# Patient Record
Sex: MALE | Race: WHITE | URBAN - METROPOLITAN AREA
[De-identification: names, ages, dates, MRNs, and addresses within clinical notes are randomized per-mention and may not be internally consistent; named-entity substitution may affect disease eponyms.]

---

## 2017-08-10 ENCOUNTER — HOSPITAL ENCOUNTER (INPATIENT)
Facility: CLINIC | Age: 32
LOS: 5 days | Discharge: HOME | DRG: 314 | End: 2017-08-15
Attending: INTERNAL MEDICINE | Admitting: INTERNAL MEDICINE
Payer: COMMERCIAL

## 2017-08-10 ENCOUNTER — APPOINTMENT (OUTPATIENT)
Dept: CARDIOLOGY | Facility: CLINIC | Age: 32
DRG: 314 | End: 2017-08-10
Attending: STUDENT IN AN ORGANIZED HEALTH CARE EDUCATION/TRAINING PROGRAM
Payer: COMMERCIAL

## 2017-08-10 DIAGNOSIS — I50.21 ACUTE SYSTOLIC HEART FAILURE (H): Primary | ICD-10-CM

## 2017-08-10 DIAGNOSIS — K59.01 SLOW TRANSIT CONSTIPATION: ICD-10-CM

## 2017-08-10 DIAGNOSIS — K51.011 ULCERATIVE PANCOLITIS WITH RECTAL BLEEDING (H): ICD-10-CM

## 2017-08-10 PROBLEM — I50.9 HEART FAILURE (H): Status: ACTIVE | Noted: 2017-08-10

## 2017-08-10 LAB
ABO + RH BLD: NORMAL
ABO + RH BLD: NORMAL
ALBUMIN SERPL-MCNC: 2.4 G/DL (ref 3.4–5)
ALP SERPL-CCNC: 59 U/L (ref 40–150)
ALT SERPL W P-5'-P-CCNC: 24 U/L (ref 0–70)
ANION GAP SERPL CALCULATED.3IONS-SCNC: 6 MMOL/L (ref 3–14)
APTT PPP: 29 SEC (ref 22–37)
AST SERPL W P-5'-P-CCNC: 10 U/L (ref 0–45)
BILIRUB DIRECT SERPL-MCNC: 0.1 MG/DL (ref 0–0.2)
BILIRUB SERPL-MCNC: 0.5 MG/DL (ref 0.2–1.3)
BUN SERPL-MCNC: 10 MG/DL (ref 7–30)
CALCIUM SERPL-MCNC: 8.6 MG/DL (ref 8.5–10.1)
CHLORIDE SERPL-SCNC: 101 MMOL/L (ref 94–109)
CHOLEST SERPL-MCNC: 137 MG/DL
CK SERPL-CCNC: 74 U/L (ref 30–300)
CO2 SERPL-SCNC: 28 MMOL/L (ref 20–32)
CREAT SERPL-MCNC: 1 MG/DL (ref 0.66–1.25)
CRP SERPL-MCNC: 150 MG/L (ref 0–8)
ERYTHROCYTE [DISTWIDTH] IN BLOOD BY AUTOMATED COUNT: 12.4 % (ref 10–15)
ERYTHROCYTE [SEDIMENTATION RATE] IN BLOOD BY WESTERGREN METHOD: 42 MM/H (ref 0–15)
GFR SERPL CREATININE-BSD FRML MDRD: 87 ML/MIN/1.7M2
GLUCOSE SERPL-MCNC: 141 MG/DL (ref 70–99)
HCT VFR BLD AUTO: 38.1 % (ref 40–53)
HDLC SERPL-MCNC: 25 MG/DL
HGB BLD-MCNC: 12.8 G/DL (ref 13.3–17.7)
INR PPP: 1.17 (ref 0.86–1.14)
LACTATE BLD-SCNC: 1.7 MMOL/L (ref 0.7–2.1)
LDLC SERPL CALC-MCNC: 83 MG/DL
MCH RBC QN AUTO: 31.7 PG (ref 26.5–33)
MCHC RBC AUTO-ENTMCNC: 33.6 G/DL (ref 31.5–36.5)
MCV RBC AUTO: 94 FL (ref 78–100)
NONHDLC SERPL-MCNC: 111 MG/DL
PLATELET # BLD AUTO: 264 10E9/L (ref 150–450)
POTASSIUM SERPL-SCNC: 4.4 MMOL/L (ref 3.4–5.3)
PROT SERPL-MCNC: 6.8 G/DL (ref 6.8–8.8)
RBC # BLD AUTO: 4.04 10E12/L (ref 4.4–5.9)
SODIUM SERPL-SCNC: 134 MMOL/L (ref 133–144)
SPECIMEN EXP DATE BLD: NORMAL
TRIGL SERPL-MCNC: 142 MG/DL
TROPONIN I SERPL-MCNC: 1.37 UG/L (ref 0–0.04)
TROPONIN I SERPL-MCNC: 1.64 UG/L (ref 0–0.04)
TSH SERPL DL<=0.005 MIU/L-ACNC: 1.41 MU/L (ref 0.4–4)
WBC # BLD AUTO: 8.4 10E9/L (ref 4–11)

## 2017-08-10 PROCEDURE — 25000132 ZZH RX MED GY IP 250 OP 250 PS 637: Performed by: STUDENT IN AN ORGANIZED HEALTH CARE EDUCATION/TRAINING PROGRAM

## 2017-08-10 PROCEDURE — 36415 COLL VENOUS BLD VENIPUNCTURE: CPT | Performed by: STUDENT IN AN ORGANIZED HEALTH CARE EDUCATION/TRAINING PROGRAM

## 2017-08-10 PROCEDURE — 85027 COMPLETE CBC AUTOMATED: CPT | Performed by: STUDENT IN AN ORGANIZED HEALTH CARE EDUCATION/TRAINING PROGRAM

## 2017-08-10 PROCEDURE — 99221 1ST HOSP IP/OBS SF/LOW 40: CPT | Mod: 25 | Performed by: INTERNAL MEDICINE

## 2017-08-10 PROCEDURE — 85610 PROTHROMBIN TIME: CPT | Performed by: STUDENT IN AN ORGANIZED HEALTH CARE EDUCATION/TRAINING PROGRAM

## 2017-08-10 PROCEDURE — 93306 TTE W/DOPPLER COMPLETE: CPT | Mod: 26 | Performed by: INTERNAL MEDICINE

## 2017-08-10 PROCEDURE — 83605 ASSAY OF LACTIC ACID: CPT | Performed by: STUDENT IN AN ORGANIZED HEALTH CARE EDUCATION/TRAINING PROGRAM

## 2017-08-10 PROCEDURE — 86140 C-REACTIVE PROTEIN: CPT | Performed by: STUDENT IN AN ORGANIZED HEALTH CARE EDUCATION/TRAINING PROGRAM

## 2017-08-10 PROCEDURE — 93005 ELECTROCARDIOGRAM TRACING: CPT

## 2017-08-10 PROCEDURE — 84484 ASSAY OF TROPONIN QUANT: CPT | Performed by: STUDENT IN AN ORGANIZED HEALTH CARE EDUCATION/TRAINING PROGRAM

## 2017-08-10 PROCEDURE — 80048 BASIC METABOLIC PNL TOTAL CA: CPT | Performed by: STUDENT IN AN ORGANIZED HEALTH CARE EDUCATION/TRAINING PROGRAM

## 2017-08-10 PROCEDURE — 84443 ASSAY THYROID STIM HORMONE: CPT | Performed by: STUDENT IN AN ORGANIZED HEALTH CARE EDUCATION/TRAINING PROGRAM

## 2017-08-10 PROCEDURE — 86900 BLOOD TYPING SEROLOGIC ABO: CPT | Performed by: INTERNAL MEDICINE

## 2017-08-10 PROCEDURE — 85652 RBC SED RATE AUTOMATED: CPT | Performed by: STUDENT IN AN ORGANIZED HEALTH CARE EDUCATION/TRAINING PROGRAM

## 2017-08-10 PROCEDURE — 85730 THROMBOPLASTIN TIME PARTIAL: CPT | Performed by: STUDENT IN AN ORGANIZED HEALTH CARE EDUCATION/TRAINING PROGRAM

## 2017-08-10 PROCEDURE — 80061 LIPID PANEL: CPT | Performed by: STUDENT IN AN ORGANIZED HEALTH CARE EDUCATION/TRAINING PROGRAM

## 2017-08-10 PROCEDURE — 80076 HEPATIC FUNCTION PANEL: CPT | Performed by: STUDENT IN AN ORGANIZED HEALTH CARE EDUCATION/TRAINING PROGRAM

## 2017-08-10 PROCEDURE — 86901 BLOOD TYPING SEROLOGIC RH(D): CPT | Performed by: INTERNAL MEDICINE

## 2017-08-10 PROCEDURE — 20000004 ZZH R&B ICU UMMC

## 2017-08-10 PROCEDURE — 93306 TTE W/DOPPLER COMPLETE: CPT

## 2017-08-10 PROCEDURE — 82550 ASSAY OF CK (CPK): CPT | Performed by: STUDENT IN AN ORGANIZED HEALTH CARE EDUCATION/TRAINING PROGRAM

## 2017-08-10 RX ORDER — DOCUSATE SODIUM 100 MG/1
100 CAPSULE, LIQUID FILLED ORAL 2 TIMES DAILY
Status: DISCONTINUED | OUTPATIENT
Start: 2017-08-10 | End: 2017-08-12

## 2017-08-10 RX ORDER — ONDANSETRON 2 MG/ML
4 INJECTION INTRAMUSCULAR; INTRAVENOUS EVERY 6 HOURS PRN
Status: DISCONTINUED | OUTPATIENT
Start: 2017-08-10 | End: 2017-08-15 | Stop reason: HOSPADM

## 2017-08-10 RX ORDER — ALUMINA, MAGNESIA, AND SIMETHICONE 2400; 2400; 240 MG/30ML; MG/30ML; MG/30ML
15-30 SUSPENSION ORAL EVERY 4 HOURS PRN
Status: DISCONTINUED | OUTPATIENT
Start: 2017-08-10 | End: 2017-08-15 | Stop reason: HOSPADM

## 2017-08-10 RX ORDER — ONDANSETRON 4 MG/1
4 TABLET, ORALLY DISINTEGRATING ORAL EVERY 6 HOURS PRN
Status: DISCONTINUED | OUTPATIENT
Start: 2017-08-10 | End: 2017-08-15 | Stop reason: HOSPADM

## 2017-08-10 RX ORDER — LIDOCAINE 40 MG/G
CREAM TOPICAL
Status: DISCONTINUED | OUTPATIENT
Start: 2017-08-10 | End: 2017-08-15 | Stop reason: HOSPADM

## 2017-08-10 RX ORDER — BISACODYL 5 MG
5-15 TABLET, DELAYED RELEASE (ENTERIC COATED) ORAL DAILY PRN
Status: DISCONTINUED | OUTPATIENT
Start: 2017-08-10 | End: 2017-08-15 | Stop reason: HOSPADM

## 2017-08-10 RX ORDER — PREDNISONE 20 MG/1
60 TABLET ORAL DAILY
Status: DISCONTINUED | OUTPATIENT
Start: 2017-08-11 | End: 2017-08-15 | Stop reason: HOSPADM

## 2017-08-10 RX ORDER — ACETAMINOPHEN 650 MG/1
650 SUPPOSITORY RECTAL EVERY 4 HOURS PRN
Status: DISCONTINUED | OUTPATIENT
Start: 2017-08-10 | End: 2017-08-15 | Stop reason: HOSPADM

## 2017-08-10 RX ORDER — ACETAMINOPHEN 325 MG/1
650 TABLET ORAL EVERY 4 HOURS PRN
Status: DISCONTINUED | OUTPATIENT
Start: 2017-08-10 | End: 2017-08-15 | Stop reason: HOSPADM

## 2017-08-10 RX ORDER — MESALAMINE 4 G/60ML
4 SUSPENSION RECTAL AT BEDTIME
Status: DISCONTINUED | OUTPATIENT
Start: 2017-08-10 | End: 2017-08-11

## 2017-08-10 RX ADMIN — MESALAMINE 4 G: 4 ENEMA RECTAL at 21:47

## 2017-08-10 ASSESSMENT — PAIN DESCRIPTION - DESCRIPTORS: DESCRIPTORS: CRAMPING

## 2017-08-10 NOTE — PLAN OF CARE
Problem: Goal Outcome Summary  Goal: Goal Outcome Summary  Outcome: No Change  D: Transferred from OSH. VSS, room air, saline locked.  I/A: Admitted and labs drawn. Plan for MRI tomorrow. Checklist sent to MRI.  R: Will continue to monitor/assess and update MD as needed.

## 2017-08-10 NOTE — IP AVS SNAPSHOT
MRN:[id number]                      After Visit Summary   8/10/2017    Eugene Potter    MRN: [id number]           Thank you!     Thank you for choosing [facility] for your care. Our goal is always to provide you with excellent care. Hearing back from our patients is one way we can continue to improve our services. Please take a few minutes to complete the written survey that you may receive in the mail after you visit with us. Thank you!        Patient Information     Date Of Birth          1985        Designated Caregiver       Most Recent Value    Caregiver    Will someone help with your care after discharge? yes    Name of designated caregiver Robyn    Phone number of caregiver [phone]    Caregiver address [place]      About your hospital stay     You were admitted on:  August 10, 2017 You last received care in the:  [address] [facility]    You were discharged on:  August 15, 2017        Reason for your hospital stay       You were hospitalized for an ulcerative colitis flare and for a new development of heart failure.  You will need close follow-up with a cardiologist and gastroenterologist in [place].                  Who to Call     For medical emergencies, please call 911.  For non-urgent questions about your medical care, please call your primary care provider or clinic, [phone]  For questions related to your surgery, please call your surgery clinic        Attending Provider     Provider Specialty    Ambar Choudhury MD Cardiology    MickletonRamón MD Internal Medicine       Primary Care Provider Office Phone # Fax #    Morelia Healy [phone]       When to contact your care team       Call your primary doctor if you have any of the following: temperature greater than 100.4F or less than 96F, worsening bloody stools, worsening pain, worsening shortness of breath or chest pain.                  After Care Instructions     Activity        Your activity upon discharge: activity as tolerated            Diet       Follow this diet upon discharge: Orders Placed This Encounter      Fluid restriction 2000 ML FLUID      No Lactose Diet            Discharge Instructions       Please continue to take your prednisone 60mg by mouth daily until you see your gastroenterologist in [place] and they tell you to change or stop the dose. Please also follow-up with cardiology in [place].  You will need a repeat echocardiogram to measure the squeeze of your heart in about 4 weeks.            Monitor and record       weight every day.  Please call your primary care doctor if you gain more than 3 pounds in a day or 5 pounds over two days.                  Follow-up Appointments     Follow Up and recommended labs and tests       Follow up with primary care provider, Morelia Healy, within 7 days to evaluate medication change and for hospital follow- up.  The following labs/tests are recommended: CBC, BMP, CRP.    Follow up with Cardiology and gastroenterology at [facility] in [place] , within 2-3 weeks  to evaluate medication change, to evaluate treatment change and for hospital follow- up.  You should also follow up with Dr. Beltran in [place] who is a heart failure specialist in the next 4 weeks.  The following labs/tests are recommended: CBC, BMP, CRP.  You will need a repeat echocardiogram to evaluate your heart function in 4 weeks unless your cardiologist decides a different time frame.                  Additional Services     Cardiology Eval Adult Referral       Your provider has referred you to:  [facility]    Please be aware that coverage of these services is subject to the terms and limitations of your health insurance plan.  Call member services at your health plan with any benefit or coverage questions.      Type of Referral:  Cardiology Follow Up and Heart failure     Timeframe requested:  2-3 weeks.     Please bring the  following to your appointment:  >>   Any x-rays, CTs or MRIs which have been performed.  Contact the facility where they were done to arrange for  prior to your scheduled appointment.    >>   List of current medications  >>   This referral request   >>   Any documents/labs given to you for this referral            GASTROENTEROLOGY ADULT REF CONSULT ONLY       Preferred Location: [facility] within 2-3 weeks.       Please be aware that coverage of these services is subject to the terms and limitations of your health insurance plan.  Call member services at your health plan with any benefit or coverage questions.  Any procedures must be performed at a [facility] facility OR coordinated by your clinic's referral office.    Please bring the following with you to your appointment:    (1) Any X-Rays, CTs or MRIs which have been performed.  Contact the facility where they were done to arrange for  prior to your scheduled appointment.    (2) List of current medications   (3) This referral request   (4) Any documents/labs given to you for this referral                  Further instructions from your care team       You will be receiving a call from the Gastro clinic at [facility] to confirm the time and date of your follow up appointment. This appointment should be within 2-3 weeks of discharge. If you do not hear from them within 5 business days, please call them at [phone].      You will be receiving a call from the Cardiology clinic at [facility] to confirm the time and date of your follow up appointment. This appointment should be within 2-4 weeks of discharge. If you do not hear from them within 5 business days, please call them at [phone].      General Recommendations To Control Heart Failure When You Get Home     Instructions To Patients and Families: Please read and check off each of these important instructions as you do them when you get home.           Weight and symptoms      ___  "Put a scale in your bathroom  ___ Post a weight chart or calendar next to the scale  ___Weigh yourself every day as soon as you you get up in the morning. You should only be wearing your pajamas. Write your weight on the chart/calendar.  ___ Bring your weight chart/calendar with you to all appointments    ___Call your doctor if you gain 2 pounds in 1 day or 5 pounds in 1 week from your \"dry\" weight (baseline weight). Also call your doctor if you have shortness of breath that gets worse over time, leg swelling or fatigue.         Medicines and diet     ___ Make sure to take your medicines as prescribed.    ___Bring a current list of your medicines and all of your medicine bottles with you to all appointments.    ___ Limit fluids if you still have swelling or shortness of breath, or if your doctor tells you to do so.  ___ Eat less than 2000 mg of sodium (salt) every day. Read food labels, and do not add salt to meals.   ___ Heart healthy diet with low fat and low cholesterol          Activity and suggested lifestyle changes    ___ Stay active. Talk to your doctor about an exercise program that is safe for your heart.    ___ Stop smoking. Reduce alcohol use.      ___ Lose weight if you are overweight. Extra weight puts a lot of stress on the heart.          Control for Leg Swelling   ___ Keep your legs elevated (raised) as needed for swelling. If swelling is uncomfortable or elevation doesn t help, ask your doctor about using ACE wrap or Jobst stockings.          Follow-up appointments   ___ Make a [facility] appointment with a basic metabolic panel lab draw 3 to 5 days after you leave the hospital. Call one of the following locations:   [facility]  [phone],  [facility] [phone],  [facility]  [phone].     ___ Make sure to take your medications as prescribed and bring an accurate list of your medications and " "your weight chart/calendar to your follow up appointment at the [facility] Clinic for continued education and adjustments          What is the C[facility] clinic?    The [facility].O.R.[facility] (Cardiomyopathy, Optimization, Rehabilitation, Education) Clinic is a heart failure specialty clinic within the [facility]. At [facility] you will work with nurse practitioners to carefully adjust medicines, get education and learn who and when to call if symptoms appear. [facility] nurses specialize in helping you:    better understand your disease.    slow the progress of your disease.    improve the length and quality of your life.    detect future heart problems before they become life threatening.    avoid hospital stays.            Pending Results     Date and Time Order Name Status Description    8/14/2017 1153 Surgical pathology exam In process     8/12/2017 0919 Cytomegalovirus Qualitative PCR In process             Statement of Approval     Ordered          08/15/17 1128  I have reviewed and agree with all the recommendations and orders detailed in this document.  EFFECTIVE NOW     Approved and electronically signed by:  Easton Bo MD             Admission Information     Date & Time Provider Department Dept. Phone    8/10/2017 Ramón Juares MD [address] [facility] [phone]      Your Vitals Were     Blood Pressure Pulse Temperature Respirations Height Weight    113/63 (BP Location: Left arm) 87 98.8  F (37.1  C) (Oral) 18 1.88 m (6' 2\") 74.9 kg (165 lb 3.2 oz)    Pulse Oximetry BMI (Body Mass Index)                98% 21.21 kg/m2          MyChart Information     [employer] lets you send messages to your doctor, view your test results, renew your prescriptions, schedule appointments and more. To sign up, go to [email or web address] . Click on \"Log in\" on the left side of the screen, which will take you to the Welcome page. Then click on \"Sign up Now\" on the right side of the " page.     You will be asked to enter the access code listed below, as well as some personal information. Please follow the directions to create your username and password.     Your access code is: [email or web address]  Expires: 2017 11:16 AM     Your access code will  in 90 days. If you need help or a new code, please call your [place] clinic or [phone].        Care EveryWhere ID     This is your Care EveryWhere ID. This could be used by other organizations to access your [place] medical records  [id number]        Equal Access to Services     [facility]: Hadii tae Castellanos, wajoshua salvador, qadanielle guerrero, cookie shane . So [facility] [phone].    ATENCIÓN: Si habla español, tiene a hughes disposición servicios gratuitos de asistencia lingüística. Llame al [phone].    We comply with applicable federal civil rights laws and [place] laws. We do not discriminate on the basis of race, color, national origin, age, disability sex, sexual orientation or gender identity.               Review of your medicines      START taking        Dose / Directions    bisacodyl 5 MG EC tablet   Used for:  Slow transit constipation        Dose:  5-15 mg   Take 1-3 tablets (5-15 mg) by mouth daily as needed for constipation ( )   Quantity:  60 tablet   Refills:  3       lisinopril 2.5 MG tablet   Commonly known as:  PRINIVIL/Zestril        Dose:  2.5 mg   Take 1 tablet (2.5 mg) by mouth daily   Quantity:  30 tablet   Refills:  3       metoprolol 25 MG 24 hr tablet   Commonly known as:  TOPROL-XL        Dose:  12.5 mg   Take 0.5 tablets (12.5 mg) by mouth daily   Quantity:  30 tablet   Refills:  3       predniSONE 20 MG tablet   Commonly known as:  DELTASONE   Used for:  Ulcerative pancolitis with rectal bleeding (H)        Dose:  60 mg   Take 3 tablets (60 mg) by mouth daily   Quantity:  90 tablet   Refills:  5            Where to get your medicines      These medications  were sent to [facility] [place], [place] - [address]  [address], [facility] [address]     Phone:  [phone]     bisacodyl 5 MG EC tablet    lisinopril 2.5 MG tablet    metoprolol 25 MG 24 hr tablet    predniSONE 20 MG tablet                Protect others around you: Learn how to safely use, store and throw away your medicines at www.disposemymeds.org.             Medication List: This is a list of all your medications and when to take them. Check marks below indicate your daily home schedule. Keep this list as a reference.      Medications           Morning Afternoon Evening Bedtime As Needed    bisacodyl 5 MG EC tablet   Take 1-3 tablets (5-15 mg) by mouth daily as needed for constipation ( )                                   lisinopril 2.5 MG tablet   Commonly known as:  PRINIVIL/Zestril   Take 1 tablet (2.5 mg) by mouth daily   Last time this was given:  2.5 mg on 8/15/2017  8:40 AM                                   metoprolol 25 MG 24 hr tablet   Commonly known as:  TOPROL-XL   Take 0.5 tablets (12.5 mg) by mouth daily   Last time this was given:  12.5 mg on 8/15/2017  8:40 AM                                   predniSONE 20 MG tablet   Commonly known as:  DELTASONE   Take 3 tablets (60 mg) by mouth daily   Last time this was given:  60 mg on 8/15/2017  8:40 AM

## 2017-08-10 NOTE — IP AVS SNAPSHOT
[address] [address]    Phone:  [phone]                                       After Visit Summary   8/10/2017    Eugene Potter    MRN: [id number]           After Visit Summary Signature Page     I have received my discharge instructions, and my questions have been answered. I have discussed any challenges I see with this plan with the nurse or doctor.    ..........................................................................................................................................  Patient/Patient Representative Signature      ..........................................................................................................................................  Patient Representative Print Name and Relationship to Patient    ..................................................               ................................................  Date                                            Time    ..........................................................................................................................................  Reviewed by Signature/Title    ...................................................              ..............................................  Date                                                            Time

## 2017-08-10 NOTE — LETTER
Transition Communication Hand-off for Care Transitions to Next Level of Care Provider    Name: Eugene Potter  MRN #: [id number]  Primary Care Provider: Morelia Healy     Primary Clinic: [facility] [address] [facility] [address]     Reason for Hospitalization:  Heart failure (H) [I50.9]  Admit Date/Time: 8/10/2017 12:33 PM  Discharge Date: 8/15/2017    Payor Source: Pt recently applied for MA, application status pending         Reason for Communication Hand-off Referral: Other Continuity of care    Discharge Plan: discharge to home with follow-up    Concern for non-adherence with plan of care:   No  Discharge Needs Assessment:  Needs       Most Recent Value    Anticipated Changes Related to Illness none          Follow-up specialty is recommended: Yes    Follow-up plan:  No future appointments.    Any outstanding tests or procedures:        Referrals     Future Labs/Procedures    Cardiology Eval Adult Referral     Comments:    Your provider has referred you to:  [facility]    Please be aware that coverage of these services is subject to the terms and limitations of your health insurance plan.  Call member services at your health plan with any benefit or coverage questions.      Type of Referral:  Cardiology Follow Up and Heart failure     Timeframe requested:  2-3 weeks.     Please bring the following to your appointment:  >>   Any x-rays, CTs or MRIs which have been performed.  Contact the facility where they were done to arrange for  prior to your scheduled appointment.    >>   List of current medications  >>   This referral request   >>   Any documents/labs given to you for this referral    GASTROENTEROLOGY ADULT REF CONSULT ONLY     Comments:    Preferred Location: [facility] [place] within 2-3 weeks.       Please be aware that coverage of these services is subject to the terms and limitations of your health insurance plan.  Call member services at your health plan with  any benefit or coverage questions.  Any procedures must be performed at a [place] facility OR coordinated by your clinic's referral office.    Please bring the following with you to your appointment:    (1) Any X-Rays, CTs or MRIs which have been performed.  Contact the facility where they were done to arrange for  prior to your scheduled appointment.    (2) List of current medications   (3) This referral request   (4) Any documents/labs given to you for this referral            Daya Mcdermott  RN Care Coordinator  [phone]    AVS/Discharge Summary is the source of truth; this is a helpful guide for improved communication of patient story

## 2017-08-10 NOTE — H&P
Cardiology admission note  Attending: Dr. Choudhury  Date of Service: 8/10/2017      HPI:   This is a 33 YO M with history of Ulcerative colitis diagnosed approximately 1 month ago by colonoscopy and bx, on oral prednisone and mesalamine, presented to [facility] with chest pain. Patient was on a trip with family recently and many of the family members were sick with congestion. He started experiencing sharp, constant, stabbing chest pain worse with deep breathing and positional, about 3 days ago when he was admitted to the hospital. His trops were normal at admission and were elevated with peak trops in the 1.97 range and came down. A CT chest was done that did not show any acute findings, no effusion, no PE. Patient was found to be tachycardic this AM in the 140s (all sinus). His blood pressure was reported to be 77/41 once before transfer and he was airlifted from [facility] to here for further evaluation.    Patient says his chest pain is much better here today. Was able to get up and take bath and feels fine. Denies any SOB now. Is tachycardic.     Past Medical History:   - Ulcerative colitis  Allergies: Per MAR   No Known Allergies  Medications:   Per MAR current outpatient cardiovascular medications include:   Rowasa 4 g by rectum qhs  Prednisone 60 mg by mouth everyday    Current Facility-Administered Medications   Medication Dose Route Frequency     sodium chloride (PF)  3 mL Intracatheter Q8H     docusate sodium  100 mg Oral BID     [START ON 8/11/2017] predniSONE  60 mg Oral Daily     mesalamine  4 g Rectal At Bedtime     Family History:   No significant family history of heart disease.  Social History:   Social History   Substance Use Topics     Smoking status: Not on file     Smokeless tobacco: Not on file     Alcohol use Not on file       ROS:   A comprehensive 10 point ROS was negative other than as mentioned in HPI.    Physical Examination:   VITALS: BP 92/64  Temp 97.9  F (36.6  C) (Oral)  Resp 20  Ht  "1.88 m (6' 2\")  Wt 80.6 kg (177 lb 11.1 oz)  SpO2 96%  BMI 22.81 kg/m2    GENERAL APPEARANCE: AxO, NAD   HEENT: NCAT, EOMI, MMM.   NECK: JVP 8 cm  CHEST: CTAB   CARDIOVASCULAR: S1S2, Reg, No m/r/g. tachycardic   ABDOMEN: BS+, soft, No pulsatile masses or bruits.   EXTREMITIES: No pedal edema. Distal pulses intact.   NEURO: Grossly nonfocal.   PSYCH: Normal affect.  SKIN: Warm and dry.   Data:   Labs:  Results for orders placed or performed during the hospital encounter of 08/10/17 (from the past 24 hour(s))   Echocardiogram Complete    Narrative    [id number]  [id number]  [id number]  [id number]^CHAY ZHOU^GIOVANNI^           [facility],[place]  Echocardiography Laboratory  [address] [place] [id number]     Name: MARTA PEDRO  MRN: [id number]  : 1985  Study Date: 08/10/2017 01:08 PM  Age: 32 yrs  Gender: Male  Patient Location: [facility]  Reason For Study: Heart Failure  Ordering Physician: GIOVANNI MCKEON  Referring Physician: ALTON ARMSTRONG  Performed By: OCTAVIANO Webber     BSA: 2.1 m2  Height: 74 in  Weight: 177 lb  BP: 92/64 mmHg  _____________________________________________________________________________  __        Procedure  Echocardiogram with two-dimensional, color and spectral Doppler performed.  _____________________________________________________________________________  __        Interpretation Summary  Left ventricular size is normal.  Biplane LV EF calculated at 36%.  Right ventricular function, chamber size, wall motion, and thickness are  normal.  The inferior vena cava is normal.  Trivial pericardial effusion is present.  _____________________________________________________________________________  __        Left Ventricle  Left ventricular size is normal. Left ventricular wall thickness is normal.  Biplane LV EF calculated at 36%. Left ventricular diastolic function is  indeterminate. Moderate to severe diffuse hypokinesis is " present.     Right Ventricle  Right ventricular function, chamber size, wall motion, and thickness are  normal.     Atria  Both atria appear normal. The atrial septum is intact as assessed by color  Doppler .     Mitral Valve  The mitral valve is normal. Trace mitral insufficiency is present.        Aortic Valve  Aortic valve is normal in structure and function.     Tricuspid Valve  The tricuspid valve is normal. Trace tricuspid insufficiency is present.  Pulmonary artery systolic pressure cannot be assessed.     Pulmonic Valve  The pulmonic valve is normal. Trace pulmonic insufficiency is present.     Vessels  The aorta root is normal. The pulmonary artery is normal. The inferior vena  cava is normal.     Pericardium  Trivial pericardial effusion is present.     _____________________________________________________________________________  __  MMode/2D Measurements & Calculations  RVDd: 3.2 cm  IVSd: 1.0 cm  LVIDd: 4.9 cm  LVIDs: 3.9 cm  LVPWd: 1.1 cm  FS: 21.4 %  EDV(Teich): 113.7 ml  ESV(Teich): 64.4 ml     LV mass(C)d: 189.2 grams  LV mass(C)dI: 91.7 grams/m2  Ao root diam: 3.1 cm  asc Aorta Diam: 2.6 cm  LA/Ao: 1.1  LVOT diam: 2.3 cm  LVOT area: 4.3 cm2     EF(MOD-bp): 36.5 %  LA Volume (BP): 69.9 ml  TAPSE: 1.7 cm                 _____________________________________________________________________________  __           Report approved by: Gia Esqueda 08/10/2017 01:40 PM      Basic metabolic panel   Result Value Ref Range    Sodium 134 133 - 144 mmol/L    Potassium 4.4 3.4 - 5.3 mmol/L    Chloride 101 94 - 109 mmol/L    Carbon Dioxide 28 20 - 32 mmol/L    Anion Gap 6 3 - 14 mmol/L    Glucose 141 (H) 70 - 99 mg/dL    Urea Nitrogen 10 7 - 30 mg/dL    Creatinine 1.00 0.66 - 1.25 mg/dL    GFR Estimate 87 >60 mL/min/1.7m2    GFR Estimate If Black >90   GFR Calc   >60 mL/min/1.7m2    Calcium 8.6 8.5 - 10.1 mg/dL   Troponin I   Result Value Ref Range    Troponin I ES 1.636 (HH) 0.000 - 0.045 ug/L    CBC with platelets   Result Value Ref Range    WBC 8.4 4.0 - 11.0 10e9/L    RBC Count 4.04 (L) 4.4 - 5.9 10e12/L    Hemoglobin 12.8 (L) 13.3 - 17.7 g/dL    Hematocrit 38.1 (L) 40.0 - 53.0 %    MCV 94 78 - 100 fl    MCH 31.7 26.5 - 33.0 pg    MCHC 33.6 31.5 - 36.5 g/dL    RDW 12.4 10.0 - 15.0 %    Platelet Count 264 150 - 450 10e9/L   INR   Result Value Ref Range    INR 1.17 (H) 0.86 - 1.14   Partial thromboplastin time   Result Value Ref Range    PTT 29 22 - 37 sec   Hepatic panel   Result Value Ref Range    Bilirubin Direct 0.1 0.0 - 0.2 mg/dL    Bilirubin Total 0.5 0.2 - 1.3 mg/dL    Albumin 2.4 (L) 3.4 - 5.0 g/dL    Protein Total 6.8 6.8 - 8.8 g/dL    Alkaline Phosphatase 59 40 - 150 U/L    ALT 24 0 - 70 U/L    AST 10 0 - 45 U/L   Lipid panel reflex to direct LDL   Result Value Ref Range    Cholesterol 137 <200 mg/dL    Triglycerides 142 <150 mg/dL    HDL Cholesterol 25 (L) >39 mg/dL    LDL Cholesterol Calculated 83 <100 mg/dL    Non HDL Cholesterol 111 <130 mg/dL   CK total   Result Value Ref Range    CK Total 74 30 - 300 U/L   CRP inflammation   Result Value Ref Range    CRP Inflammation 150.0 (H) 0.0 - 8.0 mg/L   TSH with free T4 reflex   Result Value Ref Range    TSH 1.41 0.40 - 4.00 mU/L   Erythrocyte sedimentation rate auto   Result Value Ref Range    Sed Rate 42 (H) 0 - 15 mm/h   ABO and Rh   Result Value Ref Range    ABO Pending     RH(D) Pending     Specimen Expires Pending       EKG: Sinus tachycardia.    Tele:   Assessment:   This is a 31 YO male transferred from [facility] for complaints of chest pain and low ejection fraction.   - Acute decompensated heart failure with systolic dysfunction with LVEF of 35% with normal RV function, currently NYHA Class III etiology probably viral myocarditis, unlikely giant cell, cannot rule out other etiologies.     Plan:  - There is no acute need for swan tonight or biopsy. His vitals are stable except that he is a little tachycardic. Will continue to monitor in ICU  with tele considering myocardial inflammation.  - Plan for cardiac MRI in the AM. Will decide on biopsy depending on his cardiac MRI in the AM.  - Etiology most likely being viral myocarditis considering his immunosuppression. Unlikely giant cell with stable vitals for the last week. No need for pulse steroid. MRI will help to see if any other inflammatory etiology.    - Will not initiate any cardiac medications now.   - Continue mesalamine and prednisone per his home regimen.  - DVT ppx- SCD and ambulation.   - Diet - diet for UC.  - Dispo- ICU tonight.     The patient was discussed w/ Dr. Choudhury.  The above note reflects our joint plan.    Jaspal Olea MD  Cardiology   Pager: [phone]      Late entry - pt seen and examined on 8/10/17  I have reviewed today's vital signs, notes, medications, labs and imaging. I have also seen and examined the patient and agree with the findings and plan as outlined above.    Ambar Choudhury MD  Section Head - Advanced Heart Failure, Transplantation and Mechanical Circulatory Support  Co-Director - Adult Congenital and Cardiovascular Genetics Center  Associate Professor of Medicine, [facility]

## 2017-08-11 ENCOUNTER — APPOINTMENT (OUTPATIENT)
Dept: MRI IMAGING | Facility: CLINIC | Age: 32
DRG: 314 | End: 2017-08-11
Attending: INTERNAL MEDICINE
Payer: COMMERCIAL

## 2017-08-11 LAB
ANION GAP SERPL CALCULATED.3IONS-SCNC: 7 MMOL/L (ref 3–14)
BUN SERPL-MCNC: 12 MG/DL (ref 7–30)
CALCIUM SERPL-MCNC: 8.5 MG/DL (ref 8.5–10.1)
CHLORIDE SERPL-SCNC: 101 MMOL/L (ref 94–109)
CO2 SERPL-SCNC: 28 MMOL/L (ref 20–32)
CREAT SERPL-MCNC: 0.89 MG/DL (ref 0.66–1.25)
CRP SERPL-MCNC: 140 MG/L (ref 0–8)
GFR SERPL CREATININE-BSD FRML MDRD: NORMAL ML/MIN/1.7M2
GLUCOSE SERPL-MCNC: 96 MG/DL (ref 70–99)
INTERPRETATION ECG - MUSE: NORMAL
POTASSIUM SERPL-SCNC: 3.6 MMOL/L (ref 3.4–5.3)
SODIUM SERPL-SCNC: 136 MMOL/L (ref 133–144)

## 2017-08-11 PROCEDURE — 25000125 ZZHC RX 250: Performed by: STUDENT IN AN ORGANIZED HEALTH CARE EDUCATION/TRAINING PROGRAM

## 2017-08-11 PROCEDURE — 25000128 H RX IP 250 OP 636: Performed by: INTERNAL MEDICINE

## 2017-08-11 PROCEDURE — 99231 SBSQ HOSP IP/OBS SF/LOW 25: CPT | Mod: GC | Performed by: INTERNAL MEDICINE

## 2017-08-11 PROCEDURE — A9585 GADOBUTROL INJECTION: HCPCS | Performed by: INTERNAL MEDICINE

## 2017-08-11 PROCEDURE — 75561 CARDIAC MRI FOR MORPH W/DYE: CPT

## 2017-08-11 PROCEDURE — 86140 C-REACTIVE PROTEIN: CPT | Performed by: INTERNAL MEDICINE

## 2017-08-11 PROCEDURE — 25000132 ZZH RX MED GY IP 250 OP 250 PS 637: Performed by: STUDENT IN AN ORGANIZED HEALTH CARE EDUCATION/TRAINING PROGRAM

## 2017-08-11 PROCEDURE — 21400006 ZZH R&B CCU INTERMEDIATE UMMC

## 2017-08-11 PROCEDURE — 75561 CARDIAC MRI FOR MORPH W/DYE: CPT | Mod: 26 | Performed by: INTERNAL MEDICINE

## 2017-08-11 PROCEDURE — 36415 COLL VENOUS BLD VENIPUNCTURE: CPT | Performed by: INTERNAL MEDICINE

## 2017-08-11 PROCEDURE — 25000132 ZZH RX MED GY IP 250 OP 250 PS 637: Performed by: INTERNAL MEDICINE

## 2017-08-11 PROCEDURE — 25000131 ZZH RX MED GY IP 250 OP 636 PS 637: Performed by: STUDENT IN AN ORGANIZED HEALTH CARE EDUCATION/TRAINING PROGRAM

## 2017-08-11 PROCEDURE — 80048 BASIC METABOLIC PNL TOTAL CA: CPT | Performed by: INTERNAL MEDICINE

## 2017-08-11 RX ORDER — SODIUM CHLORIDE 9 MG/ML
INJECTION, SOLUTION INTRAVENOUS CONTINUOUS
Status: DISPENSED | OUTPATIENT
Start: 2017-08-11 | End: 2017-08-11

## 2017-08-11 RX ORDER — OXYCODONE AND ACETAMINOPHEN 5; 325 MG/1; MG/1
1 TABLET ORAL ONCE
Status: DISCONTINUED | OUTPATIENT
Start: 2017-08-11 | End: 2017-08-11

## 2017-08-11 RX ORDER — GADOBUTROL 604.72 MG/ML
10 INJECTION INTRAVENOUS ONCE
Status: COMPLETED | OUTPATIENT
Start: 2017-08-11 | End: 2017-08-11

## 2017-08-11 RX ORDER — OXYCODONE HYDROCHLORIDE 5 MG/1
5 TABLET ORAL ONCE
Status: COMPLETED | OUTPATIENT
Start: 2017-08-11 | End: 2017-08-11

## 2017-08-11 RX ADMIN — GADOBUTROL 10 ML: 604.72 INJECTION INTRAVENOUS at 08:48

## 2017-08-11 RX ADMIN — PREDNISONE 60 MG: 50 TABLET ORAL at 09:45

## 2017-08-11 RX ADMIN — ACETAMINOPHEN 650 MG: 325 TABLET, FILM COATED ORAL at 05:05

## 2017-08-11 RX ADMIN — OXYCODONE HYDROCHLORIDE 5 MG: 5 TABLET ORAL at 05:04

## 2017-08-11 RX ADMIN — SODIUM CHLORIDE: 9 INJECTION, SOLUTION INTRAVENOUS at 13:00

## 2017-08-11 ASSESSMENT — PAIN DESCRIPTION - DESCRIPTORS
DESCRIPTORS: CRAMPING
DESCRIPTORS: CRAMPING

## 2017-08-11 NOTE — PROGRESS NOTES
[facility]  CARDIOLOGY DAILY PROGRESS NOTE    Interval History:   1 dose of oxy overnight. No further pain. Slept well.     Assessment/Plan  Assessment:   This is a 33 YO male transferred from [facility] for complaints of chest pain and low ejection fraction.   - Acute decompensated heart failure with systolic dysfunction with LVEF of 35% with normal RV function, currently NYHA Class III secondary to myocarditis with trop elevation.   - Ulcerative colitis     Plan:    - MRI showed no myocardial inflammation, no scar. Etiology most likely being mesalamine from his UC treatment, less likely viral as this appears to have a temporal association with initiation of therapy. No scar to suggest other forms of inflammatory myocarditis.   - Hold mesalamine bowel regimen today and calling GI for evaluation of his Ulcerative colitis alternate therapy.  - Will not initiate any cardiac medications now. Blood pressures soft today.   - Probably low on volume, does not appear to be congested. Was NPO overnight and has repeated bowel movements causing fluid loss.   - Continue prednisone per his home regimen.  - DVT ppx- ambulate as tolerated.   - Diet - diet for UC.  - Dispo- transfer to floor.      The patient was discussed w/ Dr. Coulter.  The above note reflects our joint plan.     Jaspal Olea MD  Cardiology   Pager: [phone]      Objective  Temp:  [97.9  F (36.6  C)-98.9  F (37.2  C)] 98  F (36.7  C)  Heart Rate:  [] 96  Resp:  [16-20] 18  BP: ()/(38-83) 112/78  SpO2:  [93 %-100 %] 100 %    Intake/Output Summary (Last 24 hours) at 08/11/17 0704    Wt Readings from Last 5 Encounters:   08/11/17 79.3 kg (174 lb 13.2 oz)     GEN: Well-appearing, mobile, obese  CV: RRR, no m/r/g, JVP 8 cm.   PULM: mild wheezes throughout and crackles at b/l bases  ABD: soft, NT/ND    Current Medications    sodium chloride (PF)  3 mL Intracatheter Q8H     docusate sodium  100 mg Oral BID     predniSONE  60  mg Oral Daily     mesalamine  4 g Rectal At Bedtime     Lab Values  Labs have been reviewed  BMP  Recent Labs  Lab 08/11/17  0605 08/10/17  1358    134   POTASSIUM 3.6 4.4   CHLORIDE 101 101   LON 8.5 8.6   CO2 28 28   BUN 12 10   CR 0.89 1.00   GLC 96 141*     LFTs  Recent Labs  Lab 08/10/17  1358   ALKPHOS 59   AST 10   ALT 24   BILITOTAL 0.5   PROTTOTAL 6.8   ALBUMIN 2.4*      CBC  Recent Labs  Lab 08/10/17  1358   WBC 8.4   RBC 4.04*   HGB 12.8*   HCT 38.1*   MCV 94   MCH 31.7   MCHC 33.6   RDW 12.4        INR  Recent Labs  Lab 08/10/17  1358   INR 1.17*     TpnInvalid input(s): TPN    Discussed w/ Dr Yfn Olea MD  Cardiology Fellow    Attending Attestation:  Patient seen and examined by me with the team. I have performed all pertinent elements of the physical examination and reviewed the note above. I have reviewed pertinent laboratory, echocardiographic, imaging, and cardiac catheterization results. I agree with the plan of care as described in this note.    Javier Coulter MD, PhD

## 2017-08-11 NOTE — PROGRESS NOTES
Transfer  Transferred from: 4E  Via: wheelchair  Reason for transfer:Pt appropriate for 6C- improved patient condition  Family: Aware of transfer  Belongings: Sent with pt  Chart: Sent with pt  Medications: Meds from bin sent with pt  Report called from: 4E

## 2017-08-11 NOTE — PLAN OF CARE
Problem: Individualization  Goal: Patient Preferences  Outcome: Improving  D: Pt afebrile, HR increased to 110's, BP variable with MAPs 60's to 80's. Pt up independently in room, voids spontaneously. I: Went to MRI this morning, fluid flush 1 liter over 4 hrs for high HR. Tolerates 2 gm sodium diet well. A: Pt states baseline abdomina  pain is tolerable. Report given to RN on 6C. P: Continue plan of care.

## 2017-08-11 NOTE — CONSULTS
GASTROENTEROLOGY CONSULTATION      Date of Admission:  8/10/2017          ASSESSMENT AND RECOMMENDATIONS:   ASSESSMENT:  32 year old male with a history of recently diagnosed ulcerative colitis (1 month ago, on prednisone and mesalamine) and 2 prior episodes of C Diff who presented to [facility] ED with chest pain and became tachycardic/ hypotensive and got transferred to [facility] ICU for concerns of cardiogenic shock. Gastroenterology has been consulted for an opinion regarding mesalamine induced myocarditis and other therapy options for treatment of his UC.     There are rare case reports of mesalamine induce myocarditis in literature which seems to be a hypersenstivity reaction to mesalamine rather than a cytotoxic effect of the medication. In one the case reports, myocarditis occurred on day 29 of therapy and in another case reports, myocarditis was seen after 3-4 years of therapy. The etiology of his myocarditis appears to be related to a viral infection (recent family trip, several family members were sick including the patient prior to developing chest pain) but mesalamine induced myocarditis can not be confidently ruled out. At this time we will favor stopping the mesalamine and keeping him only on Prednisone 60 mg OD. Patient should be scheduled to follow up with his GI at [facility] for further management.     RECOMMENDATIONS:  - Continue Prednisone 60 mg OD at this time. Will favor stopping the mesalamine.  - Please send daily labs including CBC, BMP, LFTs and CRP.  - Please send C Diff, fecal calprotectin, Stool O & P and stool culture.  - Please send blood CMV PCR.  - Please monitor vitals, strict Is and Os and stool output.  - If patient's symptoms remain stable off mesalamine, he should continue this dose till he sees his gastroenterologist as an outpatient.    Gastroenterology team will continue to follow with you. Thank you for involving us in this patient's care. Please do not hesitate to  contact the GI service with any questions or concerns.   Pt care plan to be discussed with Dr. Erickson, GI staff physician.    Ghazala Kim  Gastroenterology Fellow  P [phone]  -------------------------------------------------------------------------------------------------------------------          Chief Complaint:   We were asked by Kei of CICU team to evaluate this patient with UC with concern for mesalamine induced myocarditis.    History is obtained from the patient and the medical record.          History of Present Illness:   Eugene Potter is a 32 year old male with a history of UC (recently diagnosed, currently on Prednisone and oral mesalamine) [facility] possible [facility]ogenic shock. Gastroenterology has been consulted for an opinion regarding mesalamine induced myocarditis and other therapy options for treatment of his UC.       The patient was initially seen at [facility] in [place] on 7/19/17 at which time a colonoscopy was done. He was started on 60 mg of Prednisone and sulfasalazine 1gm QID. The patient was recently admitted to [facility] on 7/29/17 with worsening of his abdominal symptoms and then again on 8/4/17 at which time his CRP was elevated to 7.503 and was treated for a UC flare with IV methylprednisone and discharged on an oral prednisone taper. He was admitted back on 8/8/17 for chest pain. The troponin was found to be elevated to 1.97 (peak) and it was presumed that he had viral myocarditis. CRP elevated to 12.6. An Echo was done which showed global hypokinesis with an LVEF 30-35% without any valvular abnormality. He was transferred to [facility] for further management when overnight 8/11/17, he developed worsening hypotension and tachycardia with concern for cardiogenic shock with a repeat Echos showing a further drop in his LVEF to 15% and mild RV dilation.         Past Medical History:   Reviewed and edited as appropriate  No past medical history on  "file.         Past Surgical History:   Reviewed and edited as appropriate   No past surgical history on file.         Previous Endoscopy:   No results found for this or any previous visit.         Social History:   Reviewed and edited as appropriate  Non-smoker. Drinks socially.   Works as a .  Served in [place] in 2007 and 2009.         Family History:   Reviewed and edited as appropriate  No known history of gastrointestinal/liver disease or  gastrointestinal malignancies       Allergies:   Reviewed and edited as appropriate   No Known Allergies         Medications:     Current Facility-Administered Medications   Medication Dose Route Frequency     sodium chloride (PF)  3 mL Intracatheter Q8H     docusate sodium  100 mg Oral BID     predniSONE  60 mg Oral Daily            Review of Systems:   A complete review of systems was performed and is negative except as noted in the HPI           Physical Exam:   /67  Temp 97.9  F (36.6  C)  Resp 18  Ht 1.88 m (6' 2\")  Wt 79.3 kg (174 lb 13.2 oz)  SpO2 98%  BMI 22.45 kg/m2  Wt:   Wt Readings from Last 2 Encounters:   08/11/17 79.3 kg (174 lb 13.2 oz)      Constitutional: cooperative, pleasant, not dyspneic/diaphoretic, no acute distress  Eyes: Sclera anicteric/injected  Ears/nose/mouth/throat: Normal oropharynx without ulcers or exudate, mucus membranes moist, hearing intact  Neck: supple, thyroid normal size  CV: No edema  Respiratory: Unlabored breathing  Lymph: No axillary, submandibular, supraclavicular or inguinal lymphadenopathy  Abd: Nondistended, +bs, no hepatosplenomegaly, nontender, no peritoneal signs  Skin: warm, perfused, no jaundice  Neuro: AAO x 3, No asterixis  Psych: Normal affect  MSK: No gross deformities         Data:   Labs and imaging below were independently reviewed and interpreted    BMP  Recent Labs  Lab 08/11/17  0605 08/10/17  1358    134   POTASSIUM 3.6 4.4   CHLORIDE 101 101   LON 8.5 8.6   CO2 28 28   BUN 12 10   CR 0.89 " 1.00   GLC 96 141*     CBC  Recent Labs  Lab 08/10/17  1358   WBC 8.4   RBC 4.04*   HGB 12.8*   HCT 38.1*   MCV 94   MCH 31.7   MCHC 33.6   RDW 12.4        INR  Recent Labs  Lab 08/10/17  1358   INR 1.17*     LFTs  Recent Labs  Lab 08/10/17  1358   ALKPHOS 59   AST 10   ALT 24   BILITOTAL 0.5   PROTTOTAL 6.8   ALBUMIN 2.4*      PANCNo lab results found in last 7 days.    IMAGING:  CT Abdomen/Pelvis:  Inflammatory response noted involving the rectum and colon most typical forunderlying inflammatory or infectious etiology. There is no evidence ofobstruction free air free fluid.  Mild splenomegaly unclear significance.  Slightly enlarged mesenteric and retroperitoneal lymph nodes are present mostlikely reactive.    ATTENDING ATTESTATION:    REFERRING PHYSICIAN: Cole Forman  DATE SEEN:  8/11/2017    Patient was discussed, seen, and examined by me, Mack Erickson.  The plan of care and pertinent data/imaging were also reviewed with the GI Consult team and GI Fellow as well.  Agree with the above assessment and plan as delineated above.    Please contact me with any further questions.    Mack Erickson MD    [facility] - Department of Medicine  Division of Gastroenterology

## 2017-08-11 NOTE — PLAN OF CARE
Problem: Goal Outcome Summary  Goal: Goal Outcome Summary  Outcome: No Change  Transferred from 4E @ 1400.     D: Myocarditis     I: Monitored vitals and assessed pt status.   Running: PIV SL  PRN:     A: A0x4. VSS. Afebrile. BP 90s/60s. -120s. AF. Sating 90s on RA. Up independently. Denies pain. 1L NS given this afternoon, for likely dehydration. Multiple loose/soft stools d/t UC. Voiding adequately. Cardiac MRI done this AM. Very pleasant, cooperative.       P: Continue to monitor Pt status and report changes to treatment team.

## 2017-08-11 NOTE — PLAN OF CARE
Problem: Individualization  Goal: Patient Preferences  D/A/I: Pt alert and oriented, independent in room. LS clear. SR, rates . Pain tolerable with discomfort, PRN tylenol and 1 time oxycodone given with relief. Slept majority of the night.   P: Cardiac MRI in AM. Will continue to monitor and notify MD of any changes.

## 2017-08-12 LAB
ALBUMIN SERPL-MCNC: 1.9 G/DL (ref 3.4–5)
ALP SERPL-CCNC: 48 U/L (ref 40–150)
ALT SERPL W P-5'-P-CCNC: 19 U/L (ref 0–70)
ANION GAP SERPL CALCULATED.3IONS-SCNC: 6 MMOL/L (ref 3–14)
AST SERPL W P-5'-P-CCNC: 10 U/L (ref 0–45)
BILIRUB DIRECT SERPL-MCNC: <0.1 MG/DL (ref 0–0.2)
BILIRUB SERPL-MCNC: 0.4 MG/DL (ref 0.2–1.3)
BUN SERPL-MCNC: 11 MG/DL (ref 7–30)
C DIFF TOX B STL QL: NORMAL
CALCIUM SERPL-MCNC: 8.5 MG/DL (ref 8.5–10.1)
CHLORIDE SERPL-SCNC: 99 MMOL/L (ref 94–109)
CO2 SERPL-SCNC: 30 MMOL/L (ref 20–32)
CREAT SERPL-MCNC: 0.88 MG/DL (ref 0.66–1.25)
CREAT SERPL-MCNC: 0.96 MG/DL (ref 0.66–1.25)
CRP SERPL-MCNC: 120 MG/L (ref 0–8)
ERYTHROCYTE [DISTWIDTH] IN BLOOD BY AUTOMATED COUNT: 12.4 % (ref 10–15)
GFR SERPL CREATININE-BSD FRML MDRD: NORMAL ML/MIN/1.7M2
GFR SERPL CREATININE-BSD FRML MDRD: NORMAL ML/MIN/1.7M2
GLUCOSE SERPL-MCNC: 85 MG/DL (ref 70–99)
HCT VFR BLD AUTO: 32.5 % (ref 40–53)
HGB BLD-MCNC: 10.8 G/DL (ref 13.3–17.7)
MCH RBC QN AUTO: 31 PG (ref 26.5–33)
MCHC RBC AUTO-ENTMCNC: 33.2 G/DL (ref 31.5–36.5)
MCV RBC AUTO: 93 FL (ref 78–100)
PLATELET # BLD AUTO: 218 10E9/L (ref 150–450)
PLATELET # BLD AUTO: 283 10E9/L (ref 150–450)
POTASSIUM SERPL-SCNC: 3.5 MMOL/L (ref 3.4–5.3)
PROT SERPL-MCNC: 5.8 G/DL (ref 6.8–8.8)
RBC # BLD AUTO: 3.48 10E12/L (ref 4.4–5.9)
SODIUM SERPL-SCNC: 135 MMOL/L (ref 133–144)
SPECIMEN SOURCE: NORMAL
WBC # BLD AUTO: 5.6 10E9/L (ref 4–11)

## 2017-08-12 PROCEDURE — 85027 COMPLETE CBC AUTOMATED: CPT | Performed by: STUDENT IN AN ORGANIZED HEALTH CARE EDUCATION/TRAINING PROGRAM

## 2017-08-12 PROCEDURE — 87506 IADNA-DNA/RNA PROBE TQ 6-11: CPT | Performed by: STUDENT IN AN ORGANIZED HEALTH CARE EDUCATION/TRAINING PROGRAM

## 2017-08-12 PROCEDURE — 87496 CYTOMEG DNA AMP PROBE: CPT | Performed by: INTERNAL MEDICINE

## 2017-08-12 PROCEDURE — 87177 OVA AND PARASITES SMEARS: CPT | Performed by: STUDENT IN AN ORGANIZED HEALTH CARE EDUCATION/TRAINING PROGRAM

## 2017-08-12 PROCEDURE — 25000125 ZZHC RX 250: Performed by: STUDENT IN AN ORGANIZED HEALTH CARE EDUCATION/TRAINING PROGRAM

## 2017-08-12 PROCEDURE — 36415 COLL VENOUS BLD VENIPUNCTURE: CPT | Performed by: STUDENT IN AN ORGANIZED HEALTH CARE EDUCATION/TRAINING PROGRAM

## 2017-08-12 PROCEDURE — 80048 BASIC METABOLIC PNL TOTAL CA: CPT | Performed by: STUDENT IN AN ORGANIZED HEALTH CARE EDUCATION/TRAINING PROGRAM

## 2017-08-12 PROCEDURE — 25000128 H RX IP 250 OP 636: Performed by: STUDENT IN AN ORGANIZED HEALTH CARE EDUCATION/TRAINING PROGRAM

## 2017-08-12 PROCEDURE — 86140 C-REACTIVE PROTEIN: CPT | Performed by: STUDENT IN AN ORGANIZED HEALTH CARE EDUCATION/TRAINING PROGRAM

## 2017-08-12 PROCEDURE — 80076 HEPATIC FUNCTION PANEL: CPT | Performed by: STUDENT IN AN ORGANIZED HEALTH CARE EDUCATION/TRAINING PROGRAM

## 2017-08-12 PROCEDURE — 99232 SBSQ HOSP IP/OBS MODERATE 35: CPT | Mod: GC | Performed by: INTERNAL MEDICINE

## 2017-08-12 PROCEDURE — 87493 C DIFF AMPLIFIED PROBE: CPT | Performed by: STUDENT IN AN ORGANIZED HEALTH CARE EDUCATION/TRAINING PROGRAM

## 2017-08-12 PROCEDURE — 82565 ASSAY OF CREATININE: CPT | Performed by: STUDENT IN AN ORGANIZED HEALTH CARE EDUCATION/TRAINING PROGRAM

## 2017-08-12 PROCEDURE — 87329 GIARDIA AG IA: CPT | Performed by: STUDENT IN AN ORGANIZED HEALTH CARE EDUCATION/TRAINING PROGRAM

## 2017-08-12 PROCEDURE — 83993 ASSAY FOR CALPROTECTIN FECAL: CPT | Performed by: STUDENT IN AN ORGANIZED HEALTH CARE EDUCATION/TRAINING PROGRAM

## 2017-08-12 PROCEDURE — 25000132 ZZH RX MED GY IP 250 OP 250 PS 637: Performed by: INTERNAL MEDICINE

## 2017-08-12 PROCEDURE — 21400006 ZZH R&B CCU INTERMEDIATE UMMC

## 2017-08-12 PROCEDURE — 87209 SMEAR COMPLEX STAIN: CPT | Performed by: STUDENT IN AN ORGANIZED HEALTH CARE EDUCATION/TRAINING PROGRAM

## 2017-08-12 PROCEDURE — 85049 AUTOMATED PLATELET COUNT: CPT | Performed by: STUDENT IN AN ORGANIZED HEALTH CARE EDUCATION/TRAINING PROGRAM

## 2017-08-12 RX ORDER — LISINOPRIL 2.5 MG/1
2.5 TABLET ORAL DAILY
Status: DISCONTINUED | OUTPATIENT
Start: 2017-08-12 | End: 2017-08-15 | Stop reason: HOSPADM

## 2017-08-12 RX ADMIN — PREDNISONE 60 MG: 50 TABLET ORAL at 08:38

## 2017-08-12 RX ADMIN — Medication 12.5 MG: at 13:23

## 2017-08-12 RX ADMIN — LISINOPRIL 2.5 MG: 2.5 TABLET ORAL at 11:51

## 2017-08-12 RX ADMIN — ENOXAPARIN SODIUM 40 MG: 40 INJECTION SUBCUTANEOUS at 13:24

## 2017-08-12 NOTE — PROGRESS NOTES
Problem: Goal Outcome Summary  Goal: Goal Outcome Summary  Outcome: No Change      D: Myocarditis likely 2/2 medication for ulcerative colitis (mesalamine)  Maroon 3    I: Monitored vitals and assessed pt status.   Running: PIV SL  PRN:      A: A0x4. VSS. Afebrile. SR-ST. Sating 90s on RA. Up independently. Low fiber diet. 2L FR. C/o occasional abdominal cramping r/t UC. Sent multiple stool samples today- Cdiff negative. Stools are loose and bloody (continue to record stool count). Order for strict I/Os. Hgb 10.8 today- continue to monitor. Voiding adequately. Family at bedside. Pt very pleasant, cooperative.        P: Continue to monitor Pt status and report changes to treatment team.

## 2017-08-12 NOTE — H&P
"[facility]    Internal Medicine History and Physical - [facility] Service       Date of Admission:  8/10/2017    Chief Complaint   Ulcerative colitis flare    History is obtained from the patient    History of Present Illness   Eugene Potter is a 32 year old male who has a history of Ulcerative colitis diagnosed one month ago and is admitted for an ulcerative colitis flare and recovering myocarditis. He presented to the [facility] on 8/8/17 with chest pain that felt like a \"sledgehammer was hitting him\" and fevers.  At [place], he was tachycardic in the 140s and hypotensive. His trops were elevated at this time at 1.97 with a nl EKG. A CT chest was done in [place] and did not show any acute findings, no PE or pleural effusions. An Echo was done and he was found to have HFrEF at 35% without any valvular abnormalities.  He was then airlifted to the [facility] with concerns of cardiogenic shock where they performed another echo on 8/10 with an EF of 36%. He had an MRI on 8/11/17 that showed no scarring or inflammation. The etiology of the HFrEF was thought to be d/t mesalamine use with his UC vs viral myocarditis d/t his hx of sick contacts. They stopped his mesalamine d/t case reports of it causing myocarditis and he has improved since then.     He was diagnosed with UC on 7/19/17 at [facility] in [place]. He was started on 60mg prednisone and sulfasalazine 1mg QID. He was seen in [facility] on 7/29/17 with worsening abdominal symptoms and again on 8/4/17 where he was treated for a UC flare with IV methylprednisone and d/maye with an oral prednisone taper. While hospitalized here, he was also having several episodes of loose, bloody stools and approximately had 11-12 yesterday along with cramping abdominal pain. GI saw him on 8/10 and recommended continuing his prednisone 60mg. He notes the mesalamine and prednisone have not helped with his symptoms for the last month.    He " is feeling well today with no SOB, cough, lightheadedness, dizziness, nausea, vomiting, fever, chills, headache, or visual changes.     Review of Systems   The 10 point Review of Systems is negative other than noted in the HPI or here.     Past Medical History    I have reviewed this patient's medical history and updated it with pertinent information if needed.   Past Medical History:   Diagnosis Date     Ulcerative colitis (H)         Past Surgical History   I have reviewed this patient's surgical history and updated it with pertinent information if needed.  No past surgical history on file.     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 He is not  and does not have any children. He is currently sexual active and seldomly uses protection. He smokes and drinks occasionally but has not done either in the last 2 months. He was recently on a family vacation in [facility] but did not notice any tick or insect bites.     Family History   I have reviewed this patient's family history and updated it with pertinent information if needed.   His grandfather had an MI in his 70s and UC, his aunt also has UC.     Prior to Admission Medications   Prednisone 60mg  Mesalamine     Allergies   No Known Allergies    Physical Exam   Vital Signs: Temp: 97.7  F (36.5  C) Temp src: Oral BP: 115/70   Heart Rate: 111 Resp: 16 SpO2: 98 % O2 Device: None (Room air)    Weight: 174 lbs 9.6 oz    General Appearance: appear well, sitting in bed with friends. NAD  Eyes: PERRL, EOMI   HENT: atraumatic, normocephalic, external ear canals are intact, no erythema or discharge from nose or mouth  Respiratory: CTAB, no wheezes or crackles  Cardiovascular: RRR, normal S1, S2. No m/r/g  GI: soft, non-tender, non-distended. BS present.   Lymph/Hematologic: no  lymphadenopathy appreciated  Skin: Has 2 tattoos that do not look infected. No rashes   Musculoskeletal: 5/5 strength, normal ROM  Neurologic: A&Ox4, CN 2-12 intact, reflexes +2  Psychiatric: Appropriate affect     Assessment & Plan   Eugene Potter is a 32 year old male admitted on 8/10/2017. He has a history of ulcerative colitis and is admitted for an acute UC flare and HFrEF of 35% thought to be 2/2 mesalamine use.     #Acute UC flare  He is still having 11-12 bloody stools/day. The UC was never adequately controlled with his current medications.   -GI consulted  -Continue 60mg prednisone   -Checking CBC, CMP, CRP and stool cultures with O&P, giardia per GI  -Stool count   -Monitor fluid status with ongoing diarrhea     #HFrEF (35%), NYHA Class III, AHA/ACC Stage C  Mesalamine induced vs viral myocarditis. Most likely mesalamine induced d/t rapid recovery after stopping the medication. It is thought that the mechanism of injury is a hypersensitivity reaction as opposed to a cytotoxic affect.  MRI did not show any inflammation or scarring. He is currently asymptomatic and is walking around the halls.   -Metoprolol 12.5mg and lisinopril 2.5mg daily  -Trops peaked and are now downtrending  -F/up with PCP and primary cardiologist in [place] with a repeat ULYSSES in the future. There is also a heart specialist, Dr. Low in [place] he should see as well.     Diet: Fluid restriction 2000 ML FLUID  Low Fiber Diet  Fluids: None  DVT Prophylaxis: Enoxaparin SQ  Code Status: Full Code    Disposition Plan   Expected discharge: 2 - 3 days; recommended to prior living arrangement once UC flare resolves.     Entered: Jordyn Dai 08/12/2017, 1:51 PM   Information in the above section will display in the discharge planner report.    The patient was discussed with Dr. Balwinder Dai  [address]   Pager: [phone]  Please see sticky note for cross cover  information      Data   Data     Recent Labs  Lab 08/12/17  1304 08/12/17  0835 08/11/17  0605 08/10/17  2006 08/10/17  1358   WBC  --  5.6  --   --  8.4   HGB  --  10.8*  --   --  12.8*   MCV  --  93  --   --  94    218  --   --  264   INR  --   --   --   --  1.17*   NA  --  135 136  --  134   POTASSIUM  --  3.5 3.6  --  4.4   CHLORIDE  --  99 101  --  101   CO2  --  30 28  --  28   BUN  --  11 12  --  10   CR 0.88 0.96 0.89  --  1.00   ANIONGAP  --  6 7  --  6   LON  --  8.5 8.5  --  8.6   GLC  --  85 96  --  141*   ALBUMIN  --  1.9*  --   --  2.4*   PROTTOTAL  --  5.8*  --   --  6.8   BILITOTAL  --  0.4  --   --  0.5   ALKPHOS  --  48  --   --  59   ALT  --  19  --   --  24   AST  --  10  --   --  10   TROPI  --   --   --  1.373* 1.636*     No results found for this or any previous visit (from the past 24 hour(s)).

## 2017-08-12 NOTE — PLAN OF CARE
Problem: Goal Outcome Summary  Goal: Goal Outcome Summary  Outcome: No Change  Patient was admitted for elevated troponin and CRP inflammation and CP and was transferred from OSH for eval cardiogenic shock.  On cardiac monitoring with SR HR 70s-80s and on room air.  Patient is having Ulcerative colitis flares with bloody stool and notified on call provider and will continue to monitor.  Ad rene and took a walk in the hallway and patient slept btw cares.  Will continue with cares and notify MD of status changes.

## 2017-08-12 NOTE — PROGRESS NOTES
[facility]  CARDIOLOGY DAILY PROGRESS NOTE    ASSESSMENT/PLAN:  33 yo M with hx of Ulcerative Colitis and no prior cardiac history who was transferred from an OSH for acute decompensated systolic heart failure with new LVEF of 35%, NYHA Class III 2/2 to acute myocarditis and found to have elevated troponins. Currently in an active ulcerative colitis flare.    # Acute Myocarditis  # Acute Decompensated Systolic Heart Failure (EF 35%), NYHA Class III, AHA/ACC Stage C  MRI showed no myocardial inflammation, no scar. Etiology most likely being mesalamine from his UC treatment, less likely viral as this appears to have a temporal association with initiation of therapy. There are several published reports regarding the association of mesalamine and myocarditis. No scar to suggest other forms of inflammatory myocarditis. Currently asymptomatic at rest and with walking around the unit. No further CP. Troponins peaked at 1.6 and downtrended.  - Holding PTA mesalamine as below  - Will start metoprolol XL 12.5 mg daily and lisinopril 2.5 mg daily  - Pt to follow up with PCP and primary cardiologist in [place] upon discharge for ongoing monitoring and optimization of medications with repeat TTE in the future per their recommendations, also recommended Pt to make an appt with heart failure specialist Dr. Low in [place]    # Acute Ulcerative Colitis Flare  Continues to have multiple bloody BMs daily.  - GI consulted, appreciate recs  - Holding PTA mesalamine bowel regimen, continue prednisone 60 mg daily  - Checking daily CBC, CMP, and CRP per GI recs  - Ordered fecal studies per GI    FEN: no IVFs, replete lytes PRN, 2g Na diet  PPX: ambulate Qshift  Code: FULL  Dispo: pending improvement of UC flare, will transfer to medicine     Pt was seen and discussed the attending, Dr. Coulter, who agrees with the assessment and plan.    Fanny Hernandez MD  PGY-3, Internal  Medicine  [phone]    ======================================================================    Interval History:  RN notes reviewed. No major overnight events. Pt still having bloody BMs x4 overnight and this AM. Pt feeling better with no CP and no SOB this AM laying in bed and with walking around the unit.    Objective  Temp:  [97.9  F (36.6  C)-99.9  F (37.7  C)] 98  F (36.7  C)  Heart Rate:  [] 74  Resp:  [16-20] 16  BP: ()/(44-71) 110/71  SpO2:  [95 %-98 %] 98 %   GEN: Well-appearing, mobile, obese  CV: RRR, no m/r/g, JVP ~8 cm  PULM: mild wheezes throughout and bibasilar  ABD: soft, NT/ND, +BS  SKIN: warm and dry, no rashes on exposed surfaces    Current Medications Reviewed    Data:  Labs Reviewed  Images Reviewed    Attending Attestation:  Patient seen and examined by me with the team. I have performed all pertinent elements of the physical examination and reviewed the note above. I have reviewed pertinent laboratory, echocardiographic, imaging, and cardiac catheterization results. I agree with the plan of care as described in this note.    Javier Coulter MD, PhD

## 2017-08-13 LAB
ALBUMIN SERPL-MCNC: 2.1 G/DL (ref 3.4–5)
ALP SERPL-CCNC: 46 U/L (ref 40–150)
ALT SERPL W P-5'-P-CCNC: 24 U/L (ref 0–70)
ANION GAP SERPL CALCULATED.3IONS-SCNC: 7 MMOL/L (ref 3–14)
AST SERPL W P-5'-P-CCNC: 15 U/L (ref 0–45)
BILIRUB DIRECT SERPL-MCNC: <0.1 MG/DL (ref 0–0.2)
BILIRUB SERPL-MCNC: 0.5 MG/DL (ref 0.2–1.3)
BUN SERPL-MCNC: 11 MG/DL (ref 7–30)
C COLI+JEJUNI+LARI FUSA STL QL NAA+PROBE: NOT DETECTED
CALCIUM SERPL-MCNC: 8.7 MG/DL (ref 8.5–10.1)
CHLORIDE SERPL-SCNC: 98 MMOL/L (ref 94–109)
CO2 SERPL-SCNC: 32 MMOL/L (ref 20–32)
CREAT SERPL-MCNC: 0.97 MG/DL (ref 0.66–1.25)
CRP SERPL-MCNC: 77 MG/L (ref 0–8)
EC STX1 GENE STL QL NAA+PROBE: NOT DETECTED
EC STX2 GENE STL QL NAA+PROBE: NOT DETECTED
ENTERIC PATHOGEN COMMENT: NORMAL
ERYTHROCYTE [DISTWIDTH] IN BLOOD BY AUTOMATED COUNT: 12.4 % (ref 10–15)
GFR SERPL CREATININE-BSD FRML MDRD: ABNORMAL ML/MIN/1.7M2
GLUCOSE SERPL-MCNC: 82 MG/DL (ref 70–99)
HCT VFR BLD AUTO: 33.2 % (ref 40–53)
HGB BLD-MCNC: 11 G/DL (ref 13.3–17.7)
MCH RBC QN AUTO: 31.5 PG (ref 26.5–33)
MCHC RBC AUTO-ENTMCNC: 33.1 G/DL (ref 31.5–36.5)
MCV RBC AUTO: 95 FL (ref 78–100)
NOROV GI+II ORF1-ORF2 JNC STL QL NAA+PR: NOT DETECTED
PLATELET # BLD AUTO: 234 10E9/L (ref 150–450)
POTASSIUM SERPL-SCNC: 3.6 MMOL/L (ref 3.4–5.3)
PROT SERPL-MCNC: 5.7 G/DL (ref 6.8–8.8)
RBC # BLD AUTO: 3.49 10E12/L (ref 4.4–5.9)
RVA NSP5 STL QL NAA+PROBE: NOT DETECTED
SALMONELLA SP RPOD STL QL NAA+PROBE: NOT DETECTED
SHIGELLA SP+EIEC IPAH STL QL NAA+PROBE: NOT DETECTED
SODIUM SERPL-SCNC: 136 MMOL/L (ref 133–144)
V CHOL+PARA RFBL+TRKH+TNAA STL QL NAA+PR: NOT DETECTED
WBC # BLD AUTO: 5.5 10E9/L (ref 4–11)
Y ENTERO RECN STL QL NAA+PROBE: NOT DETECTED

## 2017-08-13 PROCEDURE — 99232 SBSQ HOSP IP/OBS MODERATE 35: CPT | Performed by: INTERNAL MEDICINE

## 2017-08-13 PROCEDURE — 25000132 ZZH RX MED GY IP 250 OP 250 PS 637: Performed by: INTERNAL MEDICINE

## 2017-08-13 PROCEDURE — 85027 COMPLETE CBC AUTOMATED: CPT | Performed by: STUDENT IN AN ORGANIZED HEALTH CARE EDUCATION/TRAINING PROGRAM

## 2017-08-13 PROCEDURE — 25000132 ZZH RX MED GY IP 250 OP 250 PS 637: Performed by: STUDENT IN AN ORGANIZED HEALTH CARE EDUCATION/TRAINING PROGRAM

## 2017-08-13 PROCEDURE — 36415 COLL VENOUS BLD VENIPUNCTURE: CPT | Performed by: STUDENT IN AN ORGANIZED HEALTH CARE EDUCATION/TRAINING PROGRAM

## 2017-08-13 PROCEDURE — 25000125 ZZHC RX 250: Performed by: STUDENT IN AN ORGANIZED HEALTH CARE EDUCATION/TRAINING PROGRAM

## 2017-08-13 PROCEDURE — 80053 COMPREHEN METABOLIC PANEL: CPT | Performed by: STUDENT IN AN ORGANIZED HEALTH CARE EDUCATION/TRAINING PROGRAM

## 2017-08-13 PROCEDURE — 86140 C-REACTIVE PROTEIN: CPT | Performed by: STUDENT IN AN ORGANIZED HEALTH CARE EDUCATION/TRAINING PROGRAM

## 2017-08-13 PROCEDURE — 21400006 ZZH R&B CCU INTERMEDIATE UMMC

## 2017-08-13 PROCEDURE — 82248 BILIRUBIN DIRECT: CPT | Performed by: STUDENT IN AN ORGANIZED HEALTH CARE EDUCATION/TRAINING PROGRAM

## 2017-08-13 RX ORDER — OXYCODONE HYDROCHLORIDE 5 MG/1
5 TABLET ORAL ONCE
Status: COMPLETED | OUTPATIENT
Start: 2017-08-13 | End: 2017-08-13

## 2017-08-13 RX ADMIN — Medication 12.5 MG: at 08:25

## 2017-08-13 RX ADMIN — ACETAMINOPHEN 650 MG: 325 TABLET, FILM COATED ORAL at 18:11

## 2017-08-13 RX ADMIN — OXYCODONE HYDROCHLORIDE 5 MG: 5 TABLET ORAL at 18:37

## 2017-08-13 RX ADMIN — LISINOPRIL 2.5 MG: 2.5 TABLET ORAL at 08:25

## 2017-08-13 RX ADMIN — PREDNISONE 60 MG: 50 TABLET ORAL at 08:25

## 2017-08-13 RX ADMIN — POLYETHYLENE GLYCOL 3350, SODIUM SULFATE ANHYDROUS, SODIUM BICARBONATE, SODIUM CHLORIDE, POTASSIUM CHLORIDE 2000 ML: 236; 22.74; 6.74; 5.86; 2.97 POWDER, FOR SOLUTION ORAL at 17:00

## 2017-08-13 ASSESSMENT — PAIN DESCRIPTION - DESCRIPTORS
DESCRIPTORS: CRAMPING

## 2017-08-13 NOTE — PROVIDER NOTIFICATION
Time: 1810  Notified: Dr. Arciniega  Reason: pt c/o severe abdominal cramping.    MD said she would order a one time Oxycodone. Give tylenol first and if in pain after an hour give Oxycodone.  Pt is c/o severe abdominal and asked md to give oxy sooner than an hour.  MD said it is ok too to give Oxycodone sooner than an hour if abdominal cramping is severe.

## 2017-08-13 NOTE — PROGRESS NOTES
[facility] Internal Medicine Progress Note          Assessment and Plan:   Assessment:   33 yo M with hx of Ulcerative Colitis and no prior cardiac history who was transferred from an OSH for acute decompensated systolic heart failure with new LVEF of 35%, NYHA Class III 2/2 to acute myocarditis and found to have elevated troponins. Currently in an active ulcerative colitis flare      Plan:   # Acute Myocarditis  # Acute Decompensated Systolic Heart Failure (EF 35%), NYHA Class III, AHA/ACC Stage C  MRI showed no myocardial inflammation, no scar. Etiology most likely being mesalamine from his UC treatment, less likely viral as this appears to have a temporal association with initiation of therapy. There are several published reports regarding the association of mesalamine and myocarditis. No scar to suggest other forms of inflammatory myocarditis. Currently asymptomatic at rest and with walking around the unit. No further CP. Troponins peaked at 1.6 and downtrended.  - Holding PTA mesalamine as below  - Continue metoprolol XL 12.5 mg daily and lisinopril 2.5 mg daily  - Pt to follow up with PCP and primary cardiologist in [place] upon discharge for ongoing monitoring and optimization of medications with repeat TTE in the future per their recommendations, also recommended Pt to make an appt with heart failure specialist Dr. Low in [place]     # Acute Ulcerative Colitis Flare  Continues to have multiple bloody BMs daily.   - Plan for Colonoscopy on Monday      - Clear liquid diet tomorrow      - 2 L Golytely 5pm tomorrow and 5am Monday morning      - NPO at 5am Monday morning  - GI consulted, appreciate recs  - Holding PTA mesalamine bowel regimen  - Continue prednisone 60 mg daily  - Checking daily CBC, CMP, and CRP per GI recs  - Ordered fecal studies per GI     FEN: no IVFs, replete lytes PRN, 2g Na diet  PPX: ambulate Qshift  Code: FULL  Dispo: pending improvement of UC flare    Staffed by CVICU  "staff today    Cassandra Dominguez MD  Internal Medicine, PGY 1  [phone]         Interval History:   Feeling improved, good energy. Still having multiple loose, bloody bowel movements. Denies chest pain. Up and walking.           Significant Problems:     Past Medical History:   Diagnosis Date     Ulcerative colitis (H)              Review of Systems:   A comprehensive review of systems was performed and found to be negative except as described in this note           Medications:     Current Facility-Administered Medications   Medication     lisinopril (PRINIVIL/Zestril) tablet 2.5 mg     metoprolol (TOPROL-XL) half-tab 12.5 mg     enoxaparin (LOVENOX) injection 40 mg     lidocaine 1 % 1 mL     lidocaine (LMX4) kit     sodium chloride (PF) 0.9% PF flush 3 mL     sodium chloride (PF) 0.9% PF flush 3 mL     medication instruction     alum & mag hydroxide-simethicone (MYLANTA ES/MAALOX  ES) suspension 15-30 mL     acetaminophen (TYLENOL) tablet 650 mg     acetaminophen (TYLENOL) Suppository 650 mg     bisacodyl (DULCOLAX) EC tablet 5-15 mg     ondansetron (ZOFRAN-ODT) ODT tab 4 mg    Or     ondansetron (ZOFRAN) injection 4 mg     predniSONE (DELTASONE) tablet 60 mg      Physical Exam:  /57 (BP Location: Left arm)  Temp 97.7  F (36.5  C) (Oral)  Resp 18  Ht 1.88 m (6' 2\")  Wt 79.2 kg (174 lb 9.6 oz)  SpO2 97%  BMI 22.42 kg/m2  GEN: NAD, pleasant, cooperative  HEENT: EOM intact, PERRL  CV: RRR, no S3/S4, no M/R/G  PULM: CTAB  ABD: Hyperactive bowel sounds, mild tenderness diffusely with palpation, no distension   EXT: No edema, ulcerations   NEURO: Alert and appropriately responsive, CN grossly intact            Data:     Lab Results   Component Value Date    WBC 5.6 08/12/2017    WBC 8.4 08/10/2017    HGB 10.8 (L) 08/12/2017    HGB 12.8 (L) 08/10/2017    HCT 32.5 (L) 08/12/2017    HCT 38.1 (L) 08/10/2017     08/12/2017     08/12/2017     08/10/2017     08/12/2017     " 08/11/2017     08/10/2017    POTASSIUM 3.5 08/12/2017    POTASSIUM 3.6 08/11/2017    POTASSIUM 4.4 08/10/2017    CHLORIDE 99 08/12/2017    CHLORIDE 101 08/11/2017    CHLORIDE 101 08/10/2017    CO2 30 08/12/2017    CO2 28 08/11/2017    CO2 28 08/10/2017    BUN 11 08/12/2017    BUN 12 08/11/2017    BUN 10 08/10/2017    CR 0.88 08/12/2017    CR 0.96 08/12/2017    CR 0.89 08/11/2017    GLC 85 08/12/2017    GLC 96 08/11/2017     (H) 08/10/2017    SED 42 (H) 08/10/2017    TROPI 1.373 (HH) 08/10/2017    TROPI 1.636 (HH) 08/10/2017    AST 10 08/12/2017    AST 10 08/10/2017    ALT 19 08/12/2017    ALT 24 08/10/2017    ALKPHOS 48 08/12/2017    ALKPHOS 59 08/10/2017    BILITOTAL 0.4 08/12/2017    BILITOTAL 0.5 08/10/2017    INR 1.17 (H) 08/10/2017     Cardiac MRI (8/10): pending    Echo (8/10):  Interpretation Summary  Left ventricular size is normal.  Biplane LV EF calculated at 36%.  Right ventricular function, chamber size, wall motion, and thickness are  normal.  The inferior vena cava is normal.  Trivial pericardial effusion is present.

## 2017-08-13 NOTE — PROGRESS NOTES
Problem: Goal Outcome Summary  Goal: Goal Outcome Summary  Outcome: No Change      D: Myocarditis likely 2/2 medication for ulcerative colitis (mesalamine)  Maroon 3     I: Monitored vitals and assessed pt status.   Running: PIV SL   PRN:      A: A0x4. VSS. Afebrile. SR. Sating 90s on RA. Up independently. Clear liquid diet. 2L FR. C/o occasional abdominal cramping r/t UC. Stools are loose and bloody (continue to record stool count). Order for strict I/Os. Hgb 11 today- continue to monitor. Voiding adequately. Family at bedside. Pt very pleasant, cooperative.        P: Colonoscopy tomorrow- NPO @ 0500 8/14. Starting golytely @ 1700 and then again tomorrow morning at 0500. Continue to monitor Pt status and report changes to treatment team.

## 2017-08-13 NOTE — PLAN OF CARE
Problem: Goal Outcome Summary  Goal: Goal Outcome Summary  Monitor SR, VSS. Low fiber diet. 2L FR. C/o occasional abdominal cramping r/t UC. Patient given copy of stool log and instructed how and what to fill out.  Voiding adequately. No issues overnight. Continue to monitor and notify MD with any questions or concerns.  Plan for Colonoscopy Monday-to start on CLD this morning and golytely prep this afternoon.

## 2017-08-13 NOTE — PROGRESS NOTES
Brief GI Note  08/12/17    Chart reviewed. Note ongoing bloody stools. Awaiting stool studies.    Will plan for colonoscopy Monday. Please place patient on clear liquid diet (no pink, red, or purple) starting tomorrow and give 2 L golytely at 5 pm and an additional 2 L golytely at 5 am.     Discussed recommendation with primary medicine team.     Marie Tee MD  Gastroenterology Fellow

## 2017-08-13 NOTE — PROGRESS NOTES
[facility] Internal Medicine Progress Note          Assessment and Plan:   Assessment:   31 yo M with hx of Ulcerative Colitis and no prior cardiac history who was transferred from an OSH for acute decompensated systolic heart failure with new LVEF of 35%, NYHA Class III 2/2 to acute myocarditis and found to have elevated troponins. Currently HF is compensated and transferred to Medicine service for management of  ulcerative colitis flare      Plan:     # Acute Ulcerative Colitis Flare  BM frequency and bloody output improving. CRP improving.   - Plan for Colonoscopy on Monday      - Clear liquid diet today      - 2 L Golytely 5pm tomorrow and 5am Monday morning      - NPO at 5am Monday morning  - GI consulted, appreciate recs  - Holding PTA mesalamine bowel regimen  - Continue prednisone 60 mg daily  - Checking daily CBC, CMP, and CRP per GI recs  - Ordered fecal studies per GI      # Acute Myocarditis  # Systolic Heart Failure (EF 35%), new diagnosis, now compensated  MRI showed no myocardial inflammation, no scar. Etiology most likely being mesalamine from his UC treatment, less likely viral as this appears to have a temporal association with initiation of therapy. There are several published reports regarding the association of mesalamine and myocarditis. No scar to suggest other forms of inflammatory myocarditis. Currently asymptomatic at rest and with walking around the unit. No further CP. Troponins peaked at 1.6 and downtrended.  - Holding PTA mesalamine as below  - Continue metoprolol XL 12.5 mg daily and lisinopril 2.5 mg daily  - Pt to follow up with PCP and primary cardiologist in [place] upon discharge for ongoing monitoring and optimization of medications with repeat TTE in the future per their recommendations, also recommended Pt to make an appt with heart failure specialist Dr. Low in [place]      FEN: no IVFs, replete lytes PRN, 2g Na diet  PPX: ambulate Qshift  Code: FULL  Dispo:  "pending improvement of UC flare      Janett Britt MD           Interval History:   Feeling beter. Had 4 BMs overnight. Last one was formed and no blood for the first time last 2 months. No abdominal pain.           Significant Problems:     Past Medical History:   Diagnosis Date     Ulcerative colitis (H)              Review of Systems:   4 point review of systems was performed and found to be negative except as described in this note           Medications:     Current Facility-Administered Medications   Medication     polyethylene glycol (GoLYTELY/NuLYTELY) suspension 2,000 mL     [START ON 8/14/2017] polyethylene glycol (GoLYTELY/NuLYTELY) suspension 2,000 mL     lisinopril (PRINIVIL/Zestril) tablet 2.5 mg     metoprolol (TOPROL-XL) half-tab 12.5 mg     enoxaparin (LOVENOX) injection 40 mg     lidocaine 1 % 1 mL     lidocaine (LMX4) kit     sodium chloride (PF) 0.9% PF flush 3 mL     sodium chloride (PF) 0.9% PF flush 3 mL     medication instruction     alum & mag hydroxide-simethicone (MYLANTA ES/MAALOX  ES) suspension 15-30 mL     acetaminophen (TYLENOL) tablet 650 mg     acetaminophen (TYLENOL) Suppository 650 mg     bisacodyl (DULCOLAX) EC tablet 5-15 mg     ondansetron (ZOFRAN-ODT) ODT tab 4 mg    Or     ondansetron (ZOFRAN) injection 4 mg     predniSONE (DELTASONE) tablet 60 mg      Physical Exam:  /64 (BP Location: Left arm)  Temp 97.7  F (36.5  C) (Oral)  Resp 18  Ht 1.88 m (6' 2\")  Wt 77.4 kg (170 lb 10.2 oz)  SpO2 95%  BMI 21.91 kg/m2  GEN: NAD, alert, pleasant  CV: RRR, nl S1S2  PULM: CTAB  ABD: +BS, non-tender, non-distended.   Ext: WWP. No edema              Data:     Lab Results   Component Value Date    WBC 5.5 08/13/2017    WBC 5.6 08/12/2017    WBC 8.4 08/10/2017    HGB 11.0 (L) 08/13/2017    HGB 10.8 (L) 08/12/2017    HGB 12.8 (L) 08/10/2017    HCT 33.2 (L) 08/13/2017    HCT 32.5 (L) 08/12/2017    HCT 38.1 (L) 08/10/2017     08/13/2017     08/12/2017     08/12/2017 "     08/13/2017     08/12/2017     08/11/2017    POTASSIUM 3.6 08/13/2017    POTASSIUM 3.5 08/12/2017    POTASSIUM 3.6 08/11/2017    CHLORIDE 98 08/13/2017    CHLORIDE 99 08/12/2017    CHLORIDE 101 08/11/2017    CO2 32 08/13/2017    CO2 30 08/12/2017    CO2 28 08/11/2017    BUN 11 08/13/2017    BUN 11 08/12/2017    BUN 12 08/11/2017    CR 0.97 08/13/2017    CR 0.88 08/12/2017    CR 0.96 08/12/2017    GLC 82 08/13/2017    GLC 85 08/12/2017    GLC 96 08/11/2017    SED 42 (H) 08/10/2017    TROPI 1.373 (HH) 08/10/2017    TROPI 1.636 (HH) 08/10/2017    AST 15 08/13/2017    AST 10 08/12/2017    AST 10 08/10/2017    ALT 24 08/13/2017    ALT 19 08/12/2017    ALT 24 08/10/2017    ALKPHOS 46 08/13/2017    ALKPHOS 48 08/12/2017    ALKPHOS 59 08/10/2017    BILITOTAL 0.5 08/13/2017    BILITOTAL 0.4 08/12/2017    BILITOTAL 0.5 08/10/2017    INR 1.17 (H) 08/10/2017

## 2017-08-14 ENCOUNTER — SURGERY (OUTPATIENT)
Age: 32
End: 2017-08-14

## 2017-08-14 ENCOUNTER — RESULTS ONLY (OUTPATIENT)
Dept: GASTROENTEROLOGY | Facility: CLINIC | Age: 32
End: 2017-08-14

## 2017-08-14 LAB
ALBUMIN SERPL-MCNC: 2.3 G/DL (ref 3.4–5)
ALP SERPL-CCNC: 50 U/L (ref 40–150)
ALT SERPL W P-5'-P-CCNC: 25 U/L (ref 0–70)
ANION GAP SERPL CALCULATED.3IONS-SCNC: 5 MMOL/L (ref 3–14)
AST SERPL W P-5'-P-CCNC: 10 U/L (ref 0–45)
BASOPHILS # BLD AUTO: 0 10E9/L (ref 0–0.2)
BASOPHILS NFR BLD AUTO: 0.2 %
BILIRUB SERPL-MCNC: 0.7 MG/DL (ref 0.2–1.3)
BUN SERPL-MCNC: 11 MG/DL (ref 7–30)
CALCIUM SERPL-MCNC: 8.9 MG/DL (ref 8.5–10.1)
CHLORIDE SERPL-SCNC: 96 MMOL/L (ref 94–109)
CO2 SERPL-SCNC: 34 MMOL/L (ref 20–32)
COLONOSCOPY: NORMAL
CREAT SERPL-MCNC: 1.03 MG/DL (ref 0.66–1.25)
CRP SERPL-MCNC: 64 MG/L (ref 0–8)
DIFFERENTIAL METHOD BLD: ABNORMAL
EOSINOPHIL # BLD AUTO: 0.1 10E9/L (ref 0–0.7)
EOSINOPHIL NFR BLD AUTO: 2.3 %
ERYTHROCYTE [DISTWIDTH] IN BLOOD BY AUTOMATED COUNT: 12.2 % (ref 10–15)
ERYTHROCYTE [SEDIMENTATION RATE] IN BLOOD BY WESTERGREN METHOD: 49 MM/H (ref 0–15)
G LAMBLIA AG STL QL IA: NORMAL
GFR SERPL CREATININE-BSD FRML MDRD: 84 ML/MIN/1.7M2
GLUCOSE SERPL-MCNC: 94 MG/DL (ref 70–99)
HCT VFR BLD AUTO: 36.3 % (ref 40–53)
HGB BLD-MCNC: 12 G/DL (ref 13.3–17.7)
IMM GRANULOCYTES # BLD: 0 10E9/L (ref 0–0.4)
IMM GRANULOCYTES NFR BLD: 0.2 %
LYMPHOCYTES # BLD AUTO: 1.5 10E9/L (ref 0.8–5.3)
LYMPHOCYTES NFR BLD AUTO: 29.5 %
MCH RBC QN AUTO: 31.7 PG (ref 26.5–33)
MCHC RBC AUTO-ENTMCNC: 33.1 G/DL (ref 31.5–36.5)
MCV RBC AUTO: 96 FL (ref 78–100)
MICRO REPORT STATUS: NORMAL
MICRO REPORT STATUS: NORMAL
MONOCYTES # BLD AUTO: 0.8 10E9/L (ref 0–1.3)
MONOCYTES NFR BLD AUTO: 14.5 %
NEUTROPHILS # BLD AUTO: 2.8 10E9/L (ref 1.6–8.3)
NEUTROPHILS NFR BLD AUTO: 53.3 %
NRBC # BLD AUTO: 0 10*3/UL
NRBC BLD AUTO-RTO: 0 /100
O+P STL MICRO: NORMAL
PLATELET # BLD AUTO: 290 10E9/L (ref 150–450)
POTASSIUM SERPL-SCNC: 3.3 MMOL/L (ref 3.4–5.3)
PROT SERPL-MCNC: 6.3 G/DL (ref 6.8–8.8)
RBC # BLD AUTO: 3.79 10E12/L (ref 4.4–5.9)
SODIUM SERPL-SCNC: 136 MMOL/L (ref 133–144)
SPECIMEN SOURCE: NORMAL
SPECIMEN SOURCE: NORMAL
WBC # BLD AUTO: 5.2 10E9/L (ref 4–11)

## 2017-08-14 PROCEDURE — 88305 TISSUE EXAM BY PATHOLOGIST: CPT | Performed by: INTERNAL MEDICINE

## 2017-08-14 PROCEDURE — 25000132 ZZH RX MED GY IP 250 OP 250 PS 637: Performed by: STUDENT IN AN ORGANIZED HEALTH CARE EDUCATION/TRAINING PROGRAM

## 2017-08-14 PROCEDURE — 86140 C-REACTIVE PROTEIN: CPT | Performed by: INTERNAL MEDICINE

## 2017-08-14 PROCEDURE — 99232 SBSQ HOSP IP/OBS MODERATE 35: CPT | Mod: GC | Performed by: INTERNAL MEDICINE

## 2017-08-14 PROCEDURE — 85652 RBC SED RATE AUTOMATED: CPT | Performed by: INTERNAL MEDICINE

## 2017-08-14 PROCEDURE — 88341 IMHCHEM/IMCYTCHM EA ADD ANTB: CPT | Performed by: INTERNAL MEDICINE

## 2017-08-14 PROCEDURE — G0500 MOD SEDAT ENDO SERVICE >5YRS: HCPCS | Performed by: INTERNAL MEDICINE

## 2017-08-14 PROCEDURE — 85025 COMPLETE CBC W/AUTO DIFF WBC: CPT | Performed by: INTERNAL MEDICINE

## 2017-08-14 PROCEDURE — 21400006 ZZH R&B CCU INTERMEDIATE UMMC

## 2017-08-14 PROCEDURE — 0DBE8ZX EXCISION OF LARGE INTESTINE, VIA NATURAL OR ARTIFICIAL OPENING ENDOSCOPIC, DIAGNOSTIC: ICD-10-PCS | Performed by: INTERNAL MEDICINE

## 2017-08-14 PROCEDURE — 36415 COLL VENOUS BLD VENIPUNCTURE: CPT | Performed by: INTERNAL MEDICINE

## 2017-08-14 PROCEDURE — 80053 COMPREHEN METABOLIC PANEL: CPT | Performed by: INTERNAL MEDICINE

## 2017-08-14 PROCEDURE — 88342 IMHCHEM/IMCYTCHM 1ST ANTB: CPT | Performed by: INTERNAL MEDICINE

## 2017-08-14 PROCEDURE — 45380 COLONOSCOPY AND BIOPSY: CPT | Performed by: INTERNAL MEDICINE

## 2017-08-14 PROCEDURE — 25000128 H RX IP 250 OP 636: Performed by: INTERNAL MEDICINE

## 2017-08-14 PROCEDURE — 25000125 ZZHC RX 250: Performed by: STUDENT IN AN ORGANIZED HEALTH CARE EDUCATION/TRAINING PROGRAM

## 2017-08-14 PROCEDURE — 25000132 ZZH RX MED GY IP 250 OP 250 PS 637: Performed by: INTERNAL MEDICINE

## 2017-08-14 RX ORDER — POTASSIUM CHLORIDE 1500 MG/1
20 TABLET, EXTENDED RELEASE ORAL ONCE
Status: COMPLETED | OUTPATIENT
Start: 2017-08-14 | End: 2017-08-14

## 2017-08-14 RX ORDER — FENTANYL CITRATE 50 UG/ML
INJECTION, SOLUTION INTRAMUSCULAR; INTRAVENOUS PRN
Status: DISCONTINUED | OUTPATIENT
Start: 2017-08-14 | End: 2017-08-14 | Stop reason: HOSPADM

## 2017-08-14 RX ORDER — LIDOCAINE 40 MG/G
CREAM TOPICAL
Status: DISCONTINUED | OUTPATIENT
Start: 2017-08-14 | End: 2017-08-14 | Stop reason: HOSPADM

## 2017-08-14 RX ADMIN — FENTANYL CITRATE 50 MCG: 50 INJECTION, SOLUTION INTRAMUSCULAR; INTRAVENOUS at 11:43

## 2017-08-14 RX ADMIN — MIDAZOLAM HYDROCHLORIDE 2 MG: 1 INJECTION, SOLUTION INTRAMUSCULAR; INTRAVENOUS at 11:39

## 2017-08-14 RX ADMIN — POLYETHYLENE GLYCOL 3350, SODIUM SULFATE ANHYDROUS, SODIUM BICARBONATE, SODIUM CHLORIDE, POTASSIUM CHLORIDE 2000 ML: 236; 22.74; 6.74; 5.86; 2.97 POWDER, FOR SOLUTION ORAL at 04:57

## 2017-08-14 RX ADMIN — PREDNISONE 60 MG: 50 TABLET ORAL at 08:03

## 2017-08-14 RX ADMIN — Medication 12.5 MG: at 08:03

## 2017-08-14 RX ADMIN — POTASSIUM CHLORIDE 20 MEQ: 1500 TABLET, EXTENDED RELEASE ORAL at 14:24

## 2017-08-14 RX ADMIN — LISINOPRIL 2.5 MG: 2.5 TABLET ORAL at 08:03

## 2017-08-14 RX ADMIN — MIDAZOLAM HYDROCHLORIDE 1 MG: 1 INJECTION, SOLUTION INTRAMUSCULAR; INTRAVENOUS at 11:42

## 2017-08-14 RX ADMIN — FENTANYL CITRATE 100 MCG: 50 INJECTION, SOLUTION INTRAMUSCULAR; INTRAVENOUS at 11:39

## 2017-08-14 NOTE — BRIEF OP NOTE
Gastroenterology Endoscopy Suite Brief Operative Note    Procedure:  Flexible sigmoidoscopy   Post-operative diagnosis:  Severe pancolitis   Staff Physician:  Dr. Mack Erickson   Fellow/Assistant(s):  Dr Ezequiel Grove    Specimens:  Please see final procedure note for further details.   Findings:  Findings consistent with ulcerative colitis were found from the rectum to the descending colon.  This was severe and characterized by erythema, complete loss of vascularity, friability, and deep ulcerations (Pal 3).  Biopsied to rule out CMV colitis. Multiple biopsies taken   Complications:  None.   Condition:  Stable   Recommendations  Diet:  Return to previous diet  PPI:  N/A  Anti-coagulants/platelets:  N/A  Octreotide:  N/A  Discharge Planning:   Continue with prednisone 60 mg daily given dramatic clinical response  Await pathology to make sure there is no CMV   Will need repeat flex/colon as outpt to decide further treatment       See full endoscopic/operative note under Chart Review, Procedures tab for details and pictures.    Mack Erickson MD    [facility] - Department of Medicine  Division of Gastroenterology

## 2017-08-14 NOTE — OR NURSING
Colonoscopy with biopsies done, pt tolerated procedure well. VSS on 2L NC, weaned to room air. Specimens sent to lab. Handoff report called to floor RN.

## 2017-08-14 NOTE — PROGRESS NOTES
[facility], [place]    Internal Medicine Progress Note - [facility] Service    Main Plans for Today   -Colonoscopy   -Continue stool count     Assessment & Plan   Eugene Potter is a 32 year old male admitted on 8/10/2017. He has a history of ulcerative colitis and is admitted for an ulcerative colitis flare with acute myocarditis with reduced EF to 35% 2/2 mesalamine use    # Ulcerative colitis flare  The UC has never been adequately controlled with mesalamine and prednisone. He continues to have loose stools but the blood amount is decreasing.   -Colonoscopy 8/14, they took biopsies but were unable to get passed the descending colon. They suspect CMV.    -Continue 60mg prednisone  -Checking CBC, CMP, CRP and stool cultures. Have been stable   -Monitor fluid status and replace K PRN   -He will need to f/up in [place] with a GI doctor and until then he will need 60mg prednisone with no taper. We will schedule an appointment for him before he leaves.     #Acute myocarditis   Mesalamine induced vs viral myocarditis. Most likely mesalamine induced d/t rapid recovery after stopping the medication. It is thought that the mechanism of injury is a hypersensitivity reaction as opposed to a cytotoxic affect.  MRI did not show any inflammation or scarring. He is currently asymptomatic and is walking around the halls.   -Metoprolol 12.5mg and lisinopril 2.5mg daily  -Trops peaked and are now downtrending  -F/up with PCP and primary cardiologist in [place] with a repeat ULYSSES in the future. There is also a heart specialist, Dr. Low in [place] he should see as well.     Diet: Fluid restriction 2000 ML FLUID  NPO per Anesthesia Guidelines for Procedure/Surgery Except for: Meds  Fluids: None  DVT Prophylaxis: Low Risk/Ambulatory with no VTE prophylaxis indicated  Code Status: Full Code    Disposition Plan   Expected discharge: Tomorrow, recommended to prior living arrangement once UC flare is  controlled.     Entered:  Elizabeth Suhas 08/14/2017, 11:43 AM   Information in the above section will display in the discharge planner report.      The patient's care was discussed with the Attending Physician, Dr. Britt.    Jordyn Dai  [facility]  Pager: [phone]  Please see sticky note for cross cover information    Interval History   No acute events overnight. He was finishing his bowel prep for the colonoscopy today. He still has some abdominal pain with his BMs which are still a little bloody but improving. No new chest pain, SOB, n/v, or headache     Physical Exam   Vital Signs: Temp: 98.4  F (36.9  C) Temp src: Oral BP: 110/76   Heart Rate: 68 Resp: 16 SpO2: 97 % O2 Device: None (Room air)    Weight: 165 lbs 12.57 oz  General Appearance: Appears well. NAD  Respiratory: CTAB, no wheezes or crackles  Cardiovascular: RRR, normal S1, S2. No m/r/g  GI: soft, non-distended. Tender to palpation in the LLQ, no rebound or guarding. BS present.   Skin: 2 large tattoos, no signs of infection.       Data   Medications     - MEDICATION INSTRUCTIONS -       - MEDICATION INSTRUCTIONS -       - MEDICATION INSTRUCTIONS -         sodium chloride (PF)  3 mL Intravenous Q8H     potassium chloride  20 mEq Oral Once     lisinopril  2.5 mg Oral Daily     metoprolol  12.5 mg Oral Daily     sodium chloride (PF)  3 mL Intracatheter Q8H     predniSONE  60 mg Oral Daily     Data     Recent Labs  Lab 08/14/17  0605 08/13/17  0746 08/12/17  1304 08/12/17  0835  08/10/17  2006 08/10/17  1358   WBC 5.2 5.5  --  5.6  --   --  8.4   HGB 12.0* 11.0*  --  10.8*  --   --  12.8*   MCV 96 95  --  93  --   --  94    234 283 218  --   --  264   INR  --   --   --   --   --   --  1.17*    136  --  135  < >  --  134   POTASSIUM 3.3* 3.6  --  3.5  < >  --  4.4   CHLORIDE 96 98  --  99  < >  --  101   CO2 34* 32  --  30  < >  --  28   BUN 11 11  --  11  < >  --  10   CR 1.03 0.97 0.88 0.96  < >   --  1.00   ANIONGAP 5 7  --  6  < >  --  6   LON 8.9 8.7  --  8.5  < >  --  8.6   GLC 94 82  --  85  < >  --  141*   ALBUMIN 2.3* 2.1*  --  1.9*  --   --  2.4*   PROTTOTAL 6.3* 5.7*  --  5.8*  --   --  6.8   BILITOTAL 0.7 0.5  --  0.4  --   --  0.5   ALKPHOS 50 46  --  48  --   --  59   ALT 25 24  --  19  --   --  24   AST 10 15  --  10  --   --  10   TROPI  --   --   --   --   --  1.373* 1.636*   < > = values in this interval not displayed.  No results found for this or any previous visit (from the past 24 hour(s)).

## 2017-08-14 NOTE — PROGRESS NOTES
[facility] Internal Medicine Progress Note          Assessment and Plan:   Assessment:   31 yo M with hx of Ulcerative Colitis and no prior cardiac history who was transferred from an OSH for acute decompensated systolic heart failure with new LVEF of 35%, NYHA Class III 2/2 to acute myocarditis and found to have elevated troponins. Currently HF is compensated and transferred to Medicine service for management of  ulcerative colitis flare      Plan:   Changes today:   - colonscopy today, s/p golytely   - replaced K   - continue metoprolol 12.5mg, lisinopril 2.5mg   - continue prednisone 60mg, plan to taper    # Acute Ulcerative Colitis Flare  BM frequency and bloody output minimal, s/p colonscopy on 8/14. Denies abdominal pain. Improving on steroids. C diff negative, enteric panel negative.  Concern regarding CMV involvement with colitis, biopsy pending.  - GI consulted, appreciate recs  - Holding PTA mesalamine bowel regimen  - Continue prednisone 60 mg daily  - Checking daily CBC, CMP, and CRP per GI recs  - O&P pending    # Acute Myocarditis  # Systolic Heart Failure (EF 35%), new diagnosis, now compensated  MRI showed no myocardial inflammation, no scar. Etiology most likely being mesalamine from his UC treatment, less likely viral as this appears to have a temporal association with initiation of therapy. There are several published reports regarding the association of mesalamine and myocarditis. No scar to suggest other forms of inflammatory myocarditis. Currently asymptomatic at rest and with walking around the unit. No further CP. Troponins peaked at 1.6 and downtrended.  - Holding PTA mesalamine as below  - Continue metoprolol XL 12.5 mg daily and lisinopril 2.5 mg daily  - Pt to follow up with PCP and primary cardiologist in [place] upon discharge for ongoing monitoring and optimization of medications with repeat TTE in the future per their recommendations, also recommended Pt to make an appt with  heart failure specialist Dr. Low in [place] [facility] [place]    #Hypokalemia  3.2 on 8/14, likely secondary to diarrhea from colonscopy prep.    - 20meq given   - BMP in AM.      FEN: no IVFs, replete lytes PRN, 2g Na diet  PPX: ambulate Qshift  Code: FULL  Dispo: likely tomorrow, pending GI recs    Patient was discussed with Dr. Britt, who agrees with this assessment and plan.     Cassandra Dominguez MD  Internal Medicine, PGY 1  [phone]           Interval History:   Continues to improve. Had a lot of stool output with golytely- states tastes awful, would have preferred powder that goes in gatorade. Denies blood in stool and abdominal pain. Having colonoscopy today.            Significant Problems:     Past Medical History:   Diagnosis Date     Congestive heart failure (H)     LVEF 36%     Ulcerative colitis (H)              Review of Systems:   4 point review of systems was performed and found to be negative except as described in this note           Medications:     Current Facility-Administered Medications   Medication     lidocaine 1 % 1 mL     lidocaine (LMX4) kit     sodium chloride (PF) 0.9% PF flush 3 mL     sodium chloride (PF) 0.9% PF flush 3 mL     May continue current IV fluids if patient has IV fluids infusing.     May take regular AM medications except those listed below     potassium chloride SA (K-DUR/KLOR-CON M) CR tablet 20 mEq     lisinopril (PRINIVIL/Zestril) tablet 2.5 mg     metoprolol (TOPROL-XL) half-tab 12.5 mg     lidocaine 1 % 1 mL     lidocaine (LMX4) kit     sodium chloride (PF) 0.9% PF flush 3 mL     sodium chloride (PF) 0.9% PF flush 3 mL     medication instruction     alum & mag hydroxide-simethicone (MYLANTA ES/MAALOX  ES) suspension 15-30 mL     acetaminophen (TYLENOL) tablet 650 mg     acetaminophen (TYLENOL) Suppository 650 mg     bisacodyl (DULCOLAX) EC tablet 5-15 mg     ondansetron (ZOFRAN-ODT) ODT tab 4 mg    Or     ondansetron (ZOFRAN) injection 4 mg     predniSONE (DELTASONE) tablet 60  "mg      Physical Exam:  /78  Temp 98.4  F (36.9  C) (Oral)  Resp 17  Ht 1.88 m (6' 2\")  Wt 75.2 kg (165 lb 12.6 oz)  SpO2 100%  BMI 21.29 kg/m2  GEN: NAD, alert, pleasant, cooperative  CV: RRR, nl S1S2, no M/R/G  PULM: CTAB  ABD: hyperactiveBS, non-tender, non-distended.   Ext: WWP. No edema              Data:     Lab Results   Component Value Date    WBC 5.2 08/14/2017    WBC 5.5 08/13/2017    WBC 5.6 08/12/2017    HGB 12.0 (L) 08/14/2017    HGB 11.0 (L) 08/13/2017    HGB 10.8 (L) 08/12/2017    HCT 36.3 (L) 08/14/2017    HCT 33.2 (L) 08/13/2017    HCT 32.5 (L) 08/12/2017     08/14/2017     08/13/2017     08/12/2017     08/14/2017     08/13/2017     08/12/2017    POTASSIUM 3.3 (L) 08/14/2017    POTASSIUM 3.6 08/13/2017    POTASSIUM 3.5 08/12/2017    CHLORIDE 96 08/14/2017    CHLORIDE 98 08/13/2017    CHLORIDE 99 08/12/2017    CO2 34 (H) 08/14/2017    CO2 32 08/13/2017    CO2 30 08/12/2017    BUN 11 08/14/2017    BUN 11 08/13/2017    BUN 11 08/12/2017    CR 1.03 08/14/2017    CR 0.97 08/13/2017    CR 0.88 08/12/2017    GLC 94 08/14/2017    GLC 82 08/13/2017    GLC 85 08/12/2017    SED 49 (H) 08/14/2017    SED 42 (H) 08/10/2017    TROPI 1.373 (HH) 08/10/2017    TROPI 1.636 (HH) 08/10/2017    AST 10 08/14/2017    AST 15 08/13/2017    AST 10 08/12/2017    ALT 25 08/14/2017    ALT 24 08/13/2017    ALT 19 08/12/2017    ALKPHOS 50 08/14/2017    ALKPHOS 46 08/13/2017    ALKPHOS 48 08/12/2017    BILITOTAL 0.7 08/14/2017    BILITOTAL 0.5 08/13/2017    BILITOTAL 0.4 08/12/2017    INR 1.17 (H) 08/10/2017     "

## 2017-08-14 NOTE — PLAN OF CARE
Problem: Goal Outcome Summary  Goal: Goal Outcome Summary  Monitor SR, VSS. NPO after MN. 2nd half of golytely prep started @ 0500. Good UO. No issues overnight. Continue to monitor and notify MD with any questions or concerns. Plan for colonoscopy today.

## 2017-08-14 NOTE — PLAN OF CARE
Problem: Goal Outcome Summary  Goal: Goal Outcome Summary  D: Admitted for Ulcerative colitis flare-up and acute myocarditis with reduced EF due to mesalamine use.   I/A: Pt had colonscopy with biopsies done today. Pt is alert and oriented x 4, denies any pain or discomfort. VSS, afebrile on room air. Cardiac monitor show SR HR 70-80.  K=3.3 20 MEQ potassium replacement given. Restarted on low fibre diet and lactose free diet, tolerating well. Voiding adequately up to the bathroom.  P: Awaiting results and possible discharge home tomorrow. Continue to monitor and notify [facility] of issues and concerns.

## 2017-08-14 NOTE — PLAN OF CARE
Problem: Individualization  Goal: Patient Preferences        D/I/A. Pt is stable.  AVSS.  Pt had severe abdominal cramping while taking Golytely this evening.  Team was notified and a one time Oxycodone was given after Tylenol.  Pt stated that his cramping is gone after pain meds.  Pt continues having BM.    P. NPO after midnight.  Pt to have the remaining 2L Golytely at 0500 and colonoscopy in am.

## 2017-08-15 ENCOUNTER — CARE COORDINATION (OUTPATIENT)
Dept: CARE COORDINATION | Facility: CLINIC | Age: 32
End: 2017-08-15

## 2017-08-15 VITALS
BODY MASS INDEX: 21.2 KG/M2 | DIASTOLIC BLOOD PRESSURE: 63 MMHG | SYSTOLIC BLOOD PRESSURE: 113 MMHG | RESPIRATION RATE: 18 BRPM | WEIGHT: 165.2 LBS | OXYGEN SATURATION: 98 % | HEIGHT: 74 IN | HEART RATE: 87 BPM | TEMPERATURE: 98.8 F

## 2017-08-15 LAB
ALBUMIN SERPL-MCNC: 2.3 G/DL (ref 3.4–5)
ALP SERPL-CCNC: 51 U/L (ref 40–150)
ALT SERPL W P-5'-P-CCNC: 27 U/L (ref 0–70)
ANION GAP SERPL CALCULATED.3IONS-SCNC: 5 MMOL/L (ref 3–14)
AST SERPL W P-5'-P-CCNC: 15 U/L (ref 0–45)
BILIRUB SERPL-MCNC: 0.4 MG/DL (ref 0.2–1.3)
BUN SERPL-MCNC: 10 MG/DL (ref 7–30)
CALCIUM SERPL-MCNC: 8.7 MG/DL (ref 8.5–10.1)
CALPROTECTIN STL-MCNT: >3000 MG/KG (ref 0–49.9)
CHLORIDE SERPL-SCNC: 100 MMOL/L (ref 94–109)
CO2 SERPL-SCNC: 30 MMOL/L (ref 20–32)
COPATH REPORT: NORMAL
CREAT SERPL-MCNC: 0.9 MG/DL (ref 0.66–1.25)
CRP SERPL-MCNC: 49 MG/L (ref 0–8)
ERYTHROCYTE [DISTWIDTH] IN BLOOD BY AUTOMATED COUNT: 12 % (ref 10–15)
ERYTHROCYTE [SEDIMENTATION RATE] IN BLOOD BY WESTERGREN METHOD: 46 MM/H (ref 0–15)
GFR SERPL CREATININE-BSD FRML MDRD: ABNORMAL ML/MIN/1.7M2
GLUCOSE SERPL-MCNC: 87 MG/DL (ref 70–99)
HCT VFR BLD AUTO: 34.1 % (ref 40–53)
HGB BLD-MCNC: 11.2 G/DL (ref 13.3–17.7)
MCH RBC QN AUTO: 31.1 PG (ref 26.5–33)
MCHC RBC AUTO-ENTMCNC: 32.8 G/DL (ref 31.5–36.5)
MCV RBC AUTO: 95 FL (ref 78–100)
PLATELET # BLD AUTO: 268 10E9/L (ref 150–450)
POTASSIUM SERPL-SCNC: 3.7 MMOL/L (ref 3.4–5.3)
PROT SERPL-MCNC: 6 G/DL (ref 6.8–8.8)
RBC # BLD AUTO: 3.6 10E12/L (ref 4.4–5.9)
SODIUM SERPL-SCNC: 135 MMOL/L (ref 133–144)
WBC # BLD AUTO: 5.8 10E9/L (ref 4–11)

## 2017-08-15 PROCEDURE — 25000125 ZZHC RX 250: Performed by: STUDENT IN AN ORGANIZED HEALTH CARE EDUCATION/TRAINING PROGRAM

## 2017-08-15 PROCEDURE — 85027 COMPLETE CBC AUTOMATED: CPT | Performed by: STUDENT IN AN ORGANIZED HEALTH CARE EDUCATION/TRAINING PROGRAM

## 2017-08-15 PROCEDURE — 85652 RBC SED RATE AUTOMATED: CPT | Performed by: STUDENT IN AN ORGANIZED HEALTH CARE EDUCATION/TRAINING PROGRAM

## 2017-08-15 PROCEDURE — 36415 COLL VENOUS BLD VENIPUNCTURE: CPT | Performed by: STUDENT IN AN ORGANIZED HEALTH CARE EDUCATION/TRAINING PROGRAM

## 2017-08-15 PROCEDURE — 86140 C-REACTIVE PROTEIN: CPT | Performed by: STUDENT IN AN ORGANIZED HEALTH CARE EDUCATION/TRAINING PROGRAM

## 2017-08-15 PROCEDURE — 25000132 ZZH RX MED GY IP 250 OP 250 PS 637: Performed by: INTERNAL MEDICINE

## 2017-08-15 PROCEDURE — 99238 HOSP IP/OBS DSCHRG MGMT 30/<: CPT | Mod: GC | Performed by: STUDENT IN AN ORGANIZED HEALTH CARE EDUCATION/TRAINING PROGRAM

## 2017-08-15 PROCEDURE — 80053 COMPREHEN METABOLIC PANEL: CPT | Performed by: STUDENT IN AN ORGANIZED HEALTH CARE EDUCATION/TRAINING PROGRAM

## 2017-08-15 RX ORDER — LISINOPRIL 2.5 MG/1
2.5 TABLET ORAL DAILY
Qty: 30 TABLET | Refills: 3 | Status: SHIPPED | OUTPATIENT
Start: 2017-08-15

## 2017-08-15 RX ORDER — PREDNISONE 20 MG/1
60 TABLET ORAL DAILY
Qty: 90 TABLET | Refills: 5 | Status: SHIPPED | OUTPATIENT
Start: 2017-08-15

## 2017-08-15 RX ORDER — BISACODYL 5 MG
5-15 TABLET, DELAYED RELEASE (ENTERIC COATED) ORAL DAILY PRN
Qty: 60 TABLET | Refills: 3 | Status: SHIPPED | OUTPATIENT
Start: 2017-08-15

## 2017-08-15 RX ORDER — METOPROLOL SUCCINATE 25 MG/1
12.5 TABLET, EXTENDED RELEASE ORAL DAILY
Qty: 30 TABLET | Refills: 3 | Status: SHIPPED | OUTPATIENT
Start: 2017-08-15

## 2017-08-15 RX ADMIN — LISINOPRIL 2.5 MG: 2.5 TABLET ORAL at 08:40

## 2017-08-15 RX ADMIN — Medication 12.5 MG: at 08:40

## 2017-08-15 RX ADMIN — PREDNISONE 60 MG: 50 TABLET ORAL at 08:40

## 2017-08-15 NOTE — PLAN OF CARE
Problem: Goal Outcome Summary  Goal: Goal Outcome Summary  Outcome: Adequate for Discharge Date Met:  08/15/17  DISCHARGE                         No discharge date for patient encounter.  ----------------------------------------------------------------------------  Discharged to: Home  Via: Automobile  Accompanied by: Family  Discharge Instructions: diet, activity, medications, follow up appointments, when to call the MD, aftercare instructions, and what to watchout for (i.e. s/s of infection, increasing SOB, palpitations, chest pain,)  Prescriptions: To be filled by [facility] discharge pharmacy per pt's request; medication list reviewed & sent with pt  Follow Up Appointments: arranged; information given  Belongings: All sent with pt  IV: out  Telemetry: off  Pt exhibits understanding of above discharge instructions; all questions answered.     Discharge Paperwork: Signed, copied, and sent home with patient.

## 2017-08-15 NOTE — PROGRESS NOTES
GASTROENTEROLOGY CONSULTATION      Date of Admission:  8/10/2017          ASSESSMENT AND RECOMMENDATIONS:   Assessment:  32 year old male with a history of ulcerative colitis (diagnosed one month ago) previously on prednisone and mesalamine, history of C Diff x2, transferred from OSH in setting of cardiogenic shock related to myocarditis.  While the etiology of the myocarditis was likely viral, the possibility of mesalamine induced myocarditis can not be definitively eliminated.  Agree with discontinuing mesalamine indefinitely.  In the time since patient has been at [facility], his UC symptoms have improved dramatically on just high dose steroids.  No pain, formed stool.  His colonoscopy on 8/14, however, revealed Pal 3 disease.  He will need to ultimately be transitioned to a maintenance regimen - anticipate biologic therapy which I introduced to him today.  It is not integral that he be converted to this therapy while in house given his improvement and the fact that he will be establishing care with GI in [facility].     Recommendations  -Recommend patient be discharged on 60mg prednisone until he is able to follow-up with GI in [place] hometown (will be establishing GI care in [facility])  -Await biopsy results - given dramatic improvement on steroids and no CMV viremia the suspicion for CMV colitis is very low  -If bloody diarrhea, abdominal pain, fever return would recommend patient seek medical care  -From GI standpoint patient is OK to discharge, needs close follow-up with repeat CMP/CBC/CRP in the near future recommended    Gastroenterology follow up recommendations: Patient will follow-up with gastroenterology in [place]    Thank you for involving us in this patient's care. Please do not hesitate to contact the GI service with any questions or concerns.     Pt care plan discussed with Dr. Erickson, GI staff physician.    Ezequiel Montano  [place]  -------------------------------------------------------------------------------------------------------------------  Subjective: Abdominal pain is gone.  Having formed, brown stool without blood.  Feels the best he has in two months per his report.          Past Medical History:   Reviewed and edited as appropriate  Past Medical History:   Diagnosis Date     Congestive heart failure (H)     LVEF 36%     Ulcerative colitis (H)             Past Surgical History:   Reviewed and edited as appropriate   Past Surgical History:   Procedure Laterality Date     COLONOSCOPY N/A 8/14/2017    Procedure: COMBINED COLONOSCOPY, SINGLE OR MULTIPLE BIOPSY/POLYPECTOMY BY BIOPSY;  Ta=10AM/Colonoscopy/diarrhea 6C 404-01;  Surgeon: Mack Ferguson MD;  Location: [facility]            Previous Endoscopy:     Results for orders placed or performed in visit on 08/14/17   COLONOSCOPY   Result Value Ref Range    COLONOSCOPY       [facility], [address] [place] [address] [phone]     Endoscopy Department  _______________________________________________________________________________  Patient Name: Eugene Potter         Procedure Date: 8/14/2017 9:29 AM  MRN: [id number]                       Account Number: [id number]  YOB: 1985              Admit Type: Inpatient  Age: 32                                Gender: Male  Note Status: Finalized                Attending MD: Mack Erickson MD  Total Sedation Time:                    _______________________________________________________________________________     Procedure:           Colonoscopy  Indications:         Chronic ulcerative pancolitis  Providers:           Mack Erickson MD, Crystal Del Rosario RN, Ezequiel Grove MD, MD  Referring MD:          Medicines:           Midazolam 3 mg IV, Fentanyl 150 micrograms IV  Complications:       No immediate complications.  ______________  _________________________________________________________________  Procedure:           Pre-Anesthesia Assessment:                       - Prior to the procedure, a History and Physical was                        performed, and patient medications and allergies were                        reviewed. The patient is competent. The risks and                        benefits of the procedure and the sedation options and                        risks were discussed with the patient. All questions                        were answered and informed consent was obtained. Patient                        identification and proposed procedure were verified by                        the physician and the nurse in the pre-procedure area in                        the endoscopy suite. Mental Status Examination: alert                        and oriented. Airway Examination: normal oropharyngeal                        airway and neck mobility. Respiratory Examination: clear                        to auscult ation. CV Examination: normal. Prophylactic                        Antibiotics: The patient does not require prophylactic                        antibiotics. Prior Anticoagulants: The patient has taken                        no previous anticoagulant or antiplatelet agents. ASA                        Grade Assessment: III - A patient with severe systemic                        disease. After reviewing the risks and benefits, the                        patient was deemed in satisfactory condition to undergo                        the procedure. The anesthesia plan was to use moderate                        sedation / analgesia (conscious sedation). Immediately                        prior to administration of medications, the patient was                        re-assessed for adequacy to receive sedatives. The heart                        rate, respiratory rate, oxygen saturations, blood                        pressure, adequacy of  pulmonary ventilation, and                        respo nse to care were monitored throughout the                        procedure. The physical status of the patient was                        re-assessed after the procedure.                       After obtaining informed consent, the colonoscope was                        passed under direct vision. Throughout the procedure,                        the patient's blood pressure, pulse, and oxygen                        saturations were monitored continuously. The Colonoscope                        was introduced through the anus and advanced to the                        descending colon. The colonoscopy was performed without                        difficulty. The patient tolerated the procedure well.                        The quality of the bowel preparation was good.                                                                                   Findings:       The perianal and digital rectal examinations were normal.       Inflammation characterized by congestion (edema), erythema, fr iability,        scarring and deep ulcerations was found in a continuous and        circumferential pattern from the rectum to the descending colon. No        sites were spared. This was severe. Biopsies were taken with a cold        forceps for histology. Given the degree of inflammation, the scope was        not advanced past the descending colon.                                                                                   Moderate Sedation:       Moderate (conscious) sedation was administered by the endoscopy nurse        and supervised by the endoscopist. The following parameters were        monitored: oxygen saturation, heart rate, blood pressure, and response        to care. Total physician intraservice time was 11 minutes.  Impression:          - Findings consistent with ulcerative colitis were found                        from the rectum to the descending colon. This  was severe                        and characterized by erythema, complete loss of                        va scularity, friability, and deep ulcerations (Pal 3).                        Biopsied to rule out CMV colitis.  Recommendation:      31 yo male diagnosed with UC pancolitis at outside                        facility one month ago, placed on prednisone and                        mesalamine. Transferred to [facility] from OSH on 8/10 in the                        setting of probable viral myocarditis and cardiogenic                        shock. Myocarditis secondary to mesalamine (reported in                        case reports in the literature) could not be                        definitively ruled out and this medication was stopped.                        He has been on 60 mg of prednisone and reports he now                        feels the best he has in two months. Colonoscopy today                        to rule out CMV colitis and to document endoscopic                        appearance of colon. He was found to have severe                        ulcerative colitis changes characteriz ed by erythema,                        congestion, friability, deep ulcerations - Pal 3.                       - [facility].                       - Await pathology results.                       - Continue prednisone 60mg PO q day/                       - Given clinical improvement in patient, would continue                        high dose steroids until he is able to follow-up with                        i primary gastroenterologist in [facility]. Despite the                        endoscopic appearance of his colon, his inflammatory                        markers are decreasing and he had normal, formed brown                        stool yesterday prior to starting the prep.                       - While inpatient, continue to trend daily CRP/CMP/CBC.                                                                                      _____________________  Mack Erickson MD  8/14/2017 12:08:58 PM  I was physically present for the entire vi gonzalez portion of the exam.  __________________________  Signature of teaching physician  Angel/Sylvain Erickson MD    __________________________  Ezequiel Grove MD, MD  Number of Addenda: 0    Note Initiated On: 8/14/2017 9:29 AM  Scope In:  Scope Out:              Social History:   Reviewed and edited as appropriate  Social History     Social History     Marital status: Single     Spouse name: N/A     Number of children: N/A     Years of education: N/A     Occupational History     Not on file.     Social History Main Topics     Smoking status: Former Smoker     Types: Cigarettes     Start date: 6/30/2017     Smokeless tobacco: Not on file     Alcohol use Yes      Comment: avg 10 oz / wk     Drug use: No     Sexual activity: Not on file     Other Topics Concern     Not on file     Social History Narrative            Family History:   Reviewed and edited as appropriate  No known history of gastrointestinal/liver disease or  gastrointestinal malignancies       Allergies:   Reviewed and edited as appropriate   No Known Allergies         Medications:     Current Facility-Administered Medications   Medication     lisinopril (PRINIVIL/Zestril) tablet 2.5 mg     metoprolol (TOPROL-XL) half-tab 12.5 mg     lidocaine 1 % 1 mL     lidocaine (LMX4) kit     sodium chloride (PF) 0.9% PF flush 3 mL     sodium chloride (PF) 0.9% PF flush 3 mL     medication instruction     alum & mag hydroxide-simethicone (MYLANTA ES/MAALOX  ES) suspension 15-30 mL     acetaminophen (TYLENOL) tablet 650 mg     acetaminophen (TYLENOL) Suppository 650 mg     bisacodyl (DULCOLAX) EC tablet 5-15 mg     ondansetron (ZOFRAN-ODT) ODT tab 4 mg    Or     ondansetron (ZOFRAN) injection 4 mg     predniSONE (DELTASONE) tablet 60 mg             Review of Systems:   A complete review of systems  "was performed and is negative except as noted in the HPI           Physical Exam:   /70 (BP Location: Left arm)  Pulse 71  Temp 98.7  F (37.1  C) (Oral)  Resp 16  Ht 1.88 m (6' 2\")  Wt 74.9 kg (165 lb 3.2 oz)  SpO2 98%  BMI 21.21 kg/m2  Wt:   Wt Readings from Last 2 Encounters:   08/15/17 74.9 kg (165 lb 3.2 oz)      Constitutional: cooperative, pleasant, not dyspneic/diaphoretic, no acute distress  Eyes: Sclera anicteric/injected  Ears/nose/mouth/throat: MMM  CV: No edema  Respiratory: Unlabored breathing  Abd:  Nondistended, +bs, no hepatosplenomegaly, nontender, no peritoneal signs  Skin: warm, perfused, no jaundice  Neuro: AAO x 3, No asterixis  Psych: Normal affect  MSK: No gross deformities         Data:   Labs and imaging below were independently reviewed and interpreted    BMP  Recent Labs  Lab 08/15/17  0604 08/14/17  0605 08/13/17  0746 08/12/17  1304 08/12/17  0835    136 136  --  135   POTASSIUM 3.7 3.3* 3.6  --  3.5   CHLORIDE 100 96 98  --  99   LON 8.7 8.9 8.7  --  8.5   CO2 30 34* 32  --  30   BUN 10 11 11  --  11   CR 0.90 1.03 0.97 0.88 0.96   GLC 87 94 82  --  85     CBC  Recent Labs  Lab 08/15/17  0604 08/14/17  0605 08/13/17  0746 08/12/17  1304 08/12/17  0835   WBC 5.8 5.2 5.5  --  5.6   RBC 3.60* 3.79* 3.49*  --  3.48*   HGB 11.2* 12.0* 11.0*  --  10.8*   HCT 34.1* 36.3* 33.2*  --  32.5*   MCV 95 96 95  --  93   MCH 31.1 31.7 31.5  --  31.0   MCHC 32.8 33.1 33.1  --  33.2   RDW 12.0 12.2 12.4  --  12.4    290 234 283 218     INR  Recent Labs  Lab 08/10/17  1358   INR 1.17*     LFTs  Recent Labs  Lab 08/15/17  0604 08/14/17  0605 08/13/17  0746 08/12/17  0835   ALKPHOS 51 50 46 48   AST 15 10 15 10   ALT 27 25 24 19   BILITOTAL 0.4 0.7 0.5 0.4   PROTTOTAL 6.0* 6.3* 5.7* 5.8*   ALBUMIN 2.3* 2.3* 2.1* 1.9*      PANCNo lab results found in last 7 days.    Imaging: Reviewed    "

## 2017-08-15 NOTE — DISCHARGE SUMMARY
Medicine Discharge Summary  Eugene Potter MRN: [id number]  1985  Home clinic: [facility]   Primary care provider: Morelia Healy  ___________________________________          Date of Admission:  8/10/2017  Date of Discharge:  8/15/2017   Admitting Physician:  Ambar Choudhury MD  Discharge Physician:  Ramón Juares MD  Discharging Service:  Internal Medicine, [place] [address]     Primary Provider: Morelia Healy         OUTPATIENT FOLLOW-UP   GI within 2-3 weeks at [facility]  Cardiology within 2-3 weeks at [facility]  Repeat Echo in 2-3 weeks.          Reason for Admission:   Mesalamine induced acute systolic heart failure           Discharge Diagnosis:   Mesalamine induced acute systolic heart failure          Procedures & Significant Findings:   Echo 8/10/17  Interpretation Summary  Left ventricular size is normal.  Biplane LV EF calculated at 36%.  Right ventricular function, chamber size, wall motion, and thickness are  normal.  The inferior vena cava is normal.  Trivial pericardial effusion is present.         Consultations:   Cardiology  GI         Hospital Course by Problem:    # Acute Ulcerative Colitis Flare  BM frequency and bloody output minimal, s/p colonscopy on 8/14. Denies abdominal pain. Improving on steroids. C diff negative, enteric panel negative.  Concern regarding CMV involvement with colitis, biopsy pending, but very low given negative CMV viremia and improvement in symptoms with steroids.   - Continue prednisone 60 mg daily until evaluated by GI in [place].   - Establish care with GI in [place] within 2-3 weeks.    - Repeat CBC, CMP, CRP on return GI visit in [place].      # Acute Myocarditis  # Systolic Heart Failure (EF 35%), new diagnosis, now compensated  MRI showed no myocardial inflammation, no scar. Etiology most likely being mesalamine from his UC treatment, less likely viral as this  "appears to have a temporal association with initiation of therapy. There are several published reports regarding the association of mesalamine and myocarditis. No scar to suggest other forms of inflammatory myocarditis. Currently asymptomatic at rest and with walking around the unit. No further CP. Troponins peaked at 1.6 and downtrended.  - Holding PTA mesalamine indefinitely.   - Continue metoprolol XL 12.5 mg daily and lisinopril 2.5 mg daily  - Pt to follow up with PCP and primary cardiologist in [place] upon discharge for ongoing monitoring and optimization of medications with repeat TTE in the future per their recommendations, also recommended Pt to make an appt with heart failure specialist Dr. Low in [place]    Physical Exam on day of Discharge:  Blood pressure 113/70, pulse 71, temperature 98.7  F (37.1  C), temperature source Oral, resp. rate 16, height 1.88 m (6' 2\"), weight 74.9 kg (165 lb 3.2 oz), SpO2 98 %.  General: Lying in bed, NAD  HEENT: MMM, PERRL  Neck: No JVD  Respiratory: CTAB, Normal WOB on RA  Heart/CV: RRR, no m/r/g, normal S1/S2, 2+ radial pulses b/l   Abdomen/GI: mild tenderness diffusely, no rebound, no guarding, no distention, normal BS  Extremities/MSK: normal bulk and tone   Skin: No rashes, ecchymoses or wounds.   Neuro: A/Ox4, CNII-XII intact, normal gait, normal speech   Psych: Normal affect and tone.     Lines/Tubes:  None          Pending Results:     Unresulted Labs Ordered in the Past 30 Days of this Admission     Date and Time Order Name Status Description    8/14/2017 1153 Surgical pathology exam In process     8/12/2017 0919 Cytomegalovirus Qualitative PCR In process     8/12/2017 0744 Calprotectin Feces In process                Discharge Medications:     Current Discharge Medication List      START taking these medications    Details   lisinopril (PRINIVIL/ZESTRIL) 2.5 MG tablet Take 1 tablet (2.5 mg) by mouth daily  Qty: 30 tablet, Refills: 3    Associated " Diagnoses: Acute systolic heart failure (H)      metoprolol (TOPROL-XL) 25 MG 24 hr tablet Take 0.5 tablets (12.5 mg) by mouth daily  Qty: 30 tablet, Refills: 3    Associated Diagnoses: Acute systolic heart failure (H)      predniSONE (DELTASONE) 20 MG tablet Take 3 tablets (60 mg) by mouth daily  Qty: 90 tablet, Refills: 5    Associated Diagnoses: Ulcerative pancolitis with rectal bleeding (H)      bisacodyl (DULCOLAX) 5 MG EC tablet Take 1-3 tablets (5-15 mg) by mouth daily as needed for constipation ( )  Qty: 60 tablet, Refills: 3    Associated Diagnoses: Slow transit constipation                  Discharge Instructions and Follow-Up:     Discharge Procedure Orders  Reason for your hospital stay   Order Comments: You were hospitalized for an ulcerative colitis flare and for a new development of heart failure.  You will need close follow-up with a cardiologist and gastroenterologist in [place].     Follow Up and recommended labs and tests   Order Comments: Follow up with primary care provider, Morelia Healy, within 7 days to evaluate medication change and for hospital follow- up.  The following labs/tests are recommended: CBC, BMP, CRP.    Follow up with Cardiology and gastroenterology at [facility] in [place] , within 2-3 weeks  to evaluate medication change, to evaluate treatment change and for hospital follow- up.  You should also follow up with Dr. Beltran in [place] who is a heart failure specialist in the next 4 weeks.  The following labs/tests are recommended: CBC, BMP, CRP.  You will need a repeat echocardiogram to evaluate your heart function in 4 weeks unless your cardiologist decides a different time frame.     Activity   Order Comments: Your activity upon discharge: activity as tolerated   Order Specific Question Answer Comments   Is discharge order? Yes      Monitor and record   Order Comments: weight every day.  Please call your primary care doctor if you gain more than 3 pounds in  a day or 5 pounds over two days.     When to contact your care team   Order Comments: Call your primary doctor if you have any of the following: temperature greater than 100.4F or less than 96F, worsening bloody stools, worsening pain, worsening shortness of breath or chest pain.     Discharge Instructions   Order Comments: Please continue to take your prednisone 60mg by mouth daily until you see your gastroenterologist in [place] and they tell you to change or stop the dose. Please also follow-up with cardiology in [place].  You will need a repeat echocardiogram to measure the squeeze of your heart in about 4 weeks.     Full Code     Diet   Order Comments: Follow this diet upon discharge: Orders Placed This Encounter     Fluid restriction 2000 ML FLUID     No Lactose Diet   Order Specific Question Answer Comments   Is discharge order? Yes             IV access None          Discharge Disposition:   Home with close outpatient follow-up.          Condition on Discharge:   Discharge condition: Stable   Code status on discharge: Full Code        Date of service: 8/15/2017    The patient was discussed with Dr. Juares.    Easton Bo  pager # [phone]

## 2017-08-15 NOTE — PROGRESS NOTES
"[facility]  \"Hello, my name is La Kohli , and I am calling from the [facility].  I want to check in and see how you are doing, after leaving the hospital.  You may also receive a call from your Care Coordinator (care team), but I want to make sure you don t have any urgent needs.  I have a couple questions to review with you:     Post-Discharge Outreach                                                    Eugene Potter is a 32 year old male     Follow-up Appointments           Follow Up and recommended labs and tests         Follow up with primary care provider, Morelia Healy, within 7 days to evaluate medication change and for hospital follow- up.  The following labs/tests are recommended: CBC, BMP, CRP.    Follow up with Cardiology and gastroenterology at [facility] in [place] , within 2-3 weeks  to evaluate medication change, to evaluate treatment change and for hospital follow- up.  You should also follow up with Dr. Beltran in [place] who is a heart failure specialist in the next 4 weeks.  The following labs/tests are recommended: CBC, BMP, CRP.  You will need a repeat echocardiogram to evaluate your heart function in 4 weeks unless your cardiologist decides a different time frame.                   Care Team:    Patient Care Team       Relationship Specialty Notifications Start End    Morelia Healy MD PCP - General Family Practice  8/10/17     Comment:  PER TRANSFER SHEET    Phone: [phone] Fax: [phone]         [facility] [address] [address]            Transition of Care Review                                                      Did you have a surgery or procedure during your hospital visit? Yes   If yes, do you have any of the following:     Signs of infection:  NO    Pain:  No     Pain Scale (0-10) 0/10     Location: Na    Wound/incision concerns? no    Do you have all of your medications/refills?  " Yes    Are you having any side effects or questions about your medication(s)? No    Do you have any new or worsening symptoms?  No    Do you have any future appointments scheduled?   No             Plan                                                      Thanks for your time.  Your Care Coordinator may follow-up within the next couple days.  In the meantime if you have questions, concerns or problems call your care team.        La Kohli

## 2017-08-15 NOTE — DISCHARGE INSTRUCTIONS
You will be receiving a call from the Gastro clinic at [facility] to confirm the time and date of your follow up appointment. This appointment should be within 2-3 weeks of discharge. If you do not hear from them within 5 business days, please call them at [phone].      You will be receiving a call from the Cardiology clinic at [facility] to confirm the time and date of your follow up appointment. This appointment should be within 2-4 weeks of discharge. If you do not hear from them within 5 business days, please call them at [phone].

## 2017-09-20 LAB
CMV DNA SPEC QL NAA+PROBE: NOT DETECTED
SPECIMEN SOURCE: NORMAL

## (undated) RX ORDER — DIPHENHYDRAMINE HYDROCHLORIDE 50 MG/ML
INJECTION INTRAMUSCULAR; INTRAVENOUS
Status: DISPENSED
Start: 2017-08-14

## (undated) RX ORDER — SIMETHICONE 40MG/0.6ML
SUSPENSION, DROPS(FINAL DOSAGE FORM)(ML) ORAL
Status: DISPENSED
Start: 2017-08-14

## (undated) RX ORDER — FENTANYL CITRATE 50 UG/ML
INJECTION, SOLUTION INTRAMUSCULAR; INTRAVENOUS
Status: DISPENSED
Start: 2017-08-14